# Patient Record
Sex: FEMALE | NOT HISPANIC OR LATINO | Employment: FULL TIME | URBAN - NONMETROPOLITAN AREA
[De-identification: names, ages, dates, MRNs, and addresses within clinical notes are randomized per-mention and may not be internally consistent; named-entity substitution may affect disease eponyms.]

---

## 2023-06-09 ENCOUNTER — APPOINTMENT (OUTPATIENT)
Dept: PRIMARY CARE | Facility: CLINIC | Age: 33
End: 2023-06-09
Payer: COMMERCIAL

## 2023-06-21 ENCOUNTER — APPOINTMENT (OUTPATIENT)
Dept: PRIMARY CARE | Facility: CLINIC | Age: 33
End: 2023-06-21
Payer: COMMERCIAL

## 2023-06-30 ENCOUNTER — APPOINTMENT (OUTPATIENT)
Dept: PRIMARY CARE | Facility: CLINIC | Age: 33
End: 2023-06-30
Payer: COMMERCIAL

## 2023-07-07 LAB
CHLAMYDIA TRACH., AMPLIFIED: NEGATIVE
N. GONORRHEA, AMPLIFIED: NEGATIVE

## 2023-09-05 PROBLEM — F43.10 PTSD (POST-TRAUMATIC STRESS DISORDER): Status: ACTIVE | Noted: 2023-09-05

## 2023-09-05 PROBLEM — Q07.00 ARNOLD-CHIARI MALFORMATION (MULTI): Status: RESOLVED | Noted: 2023-09-05 | Resolved: 2023-09-05

## 2023-09-05 PROBLEM — G93.5 ARNOLD-CHIARI MALFORMATION, TYPE I (MULTI): Status: RESOLVED | Noted: 2023-09-05 | Resolved: 2023-09-05

## 2023-09-05 PROBLEM — F41.8 ANXIETY WITH DEPRESSION: Status: ACTIVE | Noted: 2023-09-05

## 2023-09-05 RX ORDER — ETONOGESTREL AND ETHINYL ESTRADIOL .12; .015 MG/D; MG/D
RING VAGINAL
COMMUNITY
Start: 2022-12-14 | End: 2023-12-01 | Stop reason: ALTCHOICE

## 2023-09-05 RX ORDER — DULOXETIN HYDROCHLORIDE 60 MG/1
60 CAPSULE, DELAYED RELEASE ORAL DAILY
COMMUNITY
End: 2023-12-01 | Stop reason: ALTCHOICE

## 2023-09-05 RX ORDER — RIZATRIPTAN BENZOATE 10 MG/1
10 TABLET ORAL ONCE AS NEEDED
COMMUNITY
Start: 2023-05-17 | End: 2024-05-10 | Stop reason: ALTCHOICE

## 2023-09-05 RX ORDER — DULOXETIN HYDROCHLORIDE 30 MG/1
30 CAPSULE, DELAYED RELEASE ORAL DAILY
COMMUNITY
Start: 2023-03-17 | End: 2023-12-01 | Stop reason: ALTCHOICE

## 2023-09-05 RX ORDER — VALACYCLOVIR HYDROCHLORIDE 500 MG/1
500 TABLET, FILM COATED ORAL DAILY
COMMUNITY
Start: 2023-02-16 | End: 2024-03-14 | Stop reason: SDUPTHER

## 2023-11-28 PROBLEM — S91.119A TOE LACERATION: Status: RESOLVED | Noted: 2023-11-28 | Resolved: 2023-11-28

## 2023-11-28 NOTE — PROGRESS NOTES
Jese Roman is a 33 y.o. female who presents for Establish Care (OB recommending a full panel for thyroid, following with Tabin; stomach issues, GI referral; hx of migraines; declining flu vaccine;)    Needing to establish care:    Chiari malformation: Following with neurologist, Dr. Chloé Munoz. She is seeing her twice a year. She typically gets an MRI every other year. She does have C-spine arthritis and bulging discs. She has seen neurosurgeon but he doesn't want her to get surgery for this until she has seen an eye surgeon. She is having blurry vision that is intermittent, as well as occasional double vision. They are potentially planning for surgery. Discussing removing C1 disc and part of C2 and the skull. Getting dizziness and limb tingling. She has been through PT and working on hydration which help a lot.     Migraines: Doing well on rizatriptan prn. She was given aimovig monthly injections, she has had 1 dose 3 weeks ago, hasn't had a migraine since then. She does have some constant pressure and then vertigo from moving too fast.     She has strong Fhx of thyroid issues. Her twin sister has hyperthyroidism and had to have her thyroid removed.     Stomach issues: She has had stomach issues for many years. She swings back and forth between diarrhea and constipation. She gets a lot of bloating, gas, and abdominal pain.     Review of systems completed and unremarkable other than what is documented in HPI.     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  Medical history:  Medications: rizatriptan,   SurgHx: laparoscopic poly removal  Fhx: strong family history of thyroid issues, she has fraternal twin, grandmother had breast cancer, dad had skin cancer, strong history of depression on both sides, mom had lung cancer that was discovered after she   Allergies: PCN    Objective   /90 (BP Location: Right arm, Patient  "Position: Sitting, BP Cuff Size: Adult)   Pulse 79   Ht 1.6 m (5' 3\")   Wt 61 kg (134 lb 6.4 oz)   BMI 23.81 kg/m²     Gen: No acute distress, alert and oriented x3, pleasant   HEENT: moist mucous membranes, b/l external auditory canals are clear of debris, TMs within normal limits, no oropharyngeal lesions, eomi, perrla   Neck: thyroid within normal limits, no lymphadenopathy   CV: RRR, normal S1/S2, no murmur   Resp: Clear to auscultation bilaterally, no wheezes or rhonchi appreciated  Abd: soft, nontender, non-distended, no guarding/rigidity, bowel sounds present  Extr: no edema, no calf tenderness  Derm: Skin is warm and dry, no rashes appreciated  Psych: mood is good, affect is congruent, good hygiene, normal speech and eye contact  Neuro: cranial nerves grossly intact, normal gait    Assessment/Plan     #Chiari malformation:   Following with neurologist, Dr. Chloé Munoz  Considering surgery  Meeting with eye surgeon prior    #Migraines:   Just starting on aimovig  Doing ok with prn rizatriptan    #Abnormal thyroid function tests:  Check labs    #Abdominal pain  Check labs  Discussed likelihood of IBS  Discussed fiber supplementation  Discussed amitriptyline and dicyclomine  May be interested in GI referral    HCM:  Following with Mary Hay for annual gyn exam  "

## 2023-12-01 ENCOUNTER — LAB (OUTPATIENT)
Dept: LAB | Facility: LAB | Age: 33
End: 2023-12-01
Payer: COMMERCIAL

## 2023-12-01 ENCOUNTER — OFFICE VISIT (OUTPATIENT)
Dept: PRIMARY CARE | Facility: CLINIC | Age: 33
End: 2023-12-01
Payer: COMMERCIAL

## 2023-12-01 VITALS
WEIGHT: 134.4 LBS | BODY MASS INDEX: 23.81 KG/M2 | SYSTOLIC BLOOD PRESSURE: 130 MMHG | HEIGHT: 63 IN | DIASTOLIC BLOOD PRESSURE: 90 MMHG | HEART RATE: 79 BPM

## 2023-12-01 DIAGNOSIS — R94.6 ABNORMAL THYROID FUNCTION TEST: Primary | ICD-10-CM

## 2023-12-01 DIAGNOSIS — R10.9 ABDOMINAL PAIN, UNSPECIFIED ABDOMINAL LOCATION: ICD-10-CM

## 2023-12-01 DIAGNOSIS — R94.6 ABNORMAL THYROID FUNCTION TEST: ICD-10-CM

## 2023-12-01 PROBLEM — M50.30 BULGE OF CERVICAL DISC WITHOUT MYELOPATHY: Status: ACTIVE | Noted: 2023-12-01

## 2023-12-01 PROBLEM — M19.90 ARTHRITIS: Status: ACTIVE | Noted: 2023-12-01

## 2023-12-01 LAB
ALBUMIN SERPL BCP-MCNC: 5 G/DL (ref 3.4–5)
ALP SERPL-CCNC: 30 U/L (ref 33–110)
ALT SERPL W P-5'-P-CCNC: 11 U/L (ref 7–45)
ANION GAP SERPL CALC-SCNC: 11 MMOL/L (ref 10–20)
AST SERPL W P-5'-P-CCNC: 13 U/L (ref 9–39)
BASOPHILS # BLD AUTO: 0.07 X10*3/UL (ref 0–0.1)
BASOPHILS NFR BLD AUTO: 1 %
BILIRUB SERPL-MCNC: 0.3 MG/DL (ref 0–1.2)
BUN SERPL-MCNC: 9 MG/DL (ref 6–23)
CALCIUM SERPL-MCNC: 9.2 MG/DL (ref 8.6–10.3)
CHLORIDE SERPL-SCNC: 104 MMOL/L (ref 98–107)
CO2 SERPL-SCNC: 31 MMOL/L (ref 21–32)
CREAT SERPL-MCNC: 0.71 MG/DL (ref 0.5–1.05)
EOSINOPHIL # BLD AUTO: 0.07 X10*3/UL (ref 0–0.7)
EOSINOPHIL NFR BLD AUTO: 1 %
ERYTHROCYTE [DISTWIDTH] IN BLOOD BY AUTOMATED COUNT: 12.2 % (ref 11.5–14.5)
GFR SERPL CREATININE-BSD FRML MDRD: >90 ML/MIN/1.73M*2
GLUCOSE SERPL-MCNC: 89 MG/DL (ref 74–99)
HCT VFR BLD AUTO: 36.5 % (ref 36–46)
HGB BLD-MCNC: 12.1 G/DL (ref 12–16)
IMM GRANULOCYTES # BLD AUTO: 0.02 X10*3/UL (ref 0–0.7)
IMM GRANULOCYTES NFR BLD AUTO: 0.3 % (ref 0–0.9)
LYMPHOCYTES # BLD AUTO: 1.9 X10*3/UL (ref 1.2–4.8)
LYMPHOCYTES NFR BLD AUTO: 27.7 %
MCH RBC QN AUTO: 30 PG (ref 26–34)
MCHC RBC AUTO-ENTMCNC: 33.2 G/DL (ref 32–36)
MCV RBC AUTO: 91 FL (ref 80–100)
MONOCYTES # BLD AUTO: 0.55 X10*3/UL (ref 0.1–1)
MONOCYTES NFR BLD AUTO: 8 %
NEUTROPHILS # BLD AUTO: 4.25 X10*3/UL (ref 1.2–7.7)
NEUTROPHILS NFR BLD AUTO: 62 %
NRBC BLD-RTO: 0 /100 WBCS (ref 0–0)
PLATELET # BLD AUTO: 285 X10*3/UL (ref 150–450)
POTASSIUM SERPL-SCNC: 4.2 MMOL/L (ref 3.5–5.3)
PROT SERPL-MCNC: 6.9 G/DL (ref 6.4–8.2)
RBC # BLD AUTO: 4.03 X10*6/UL (ref 4–5.2)
SODIUM SERPL-SCNC: 142 MMOL/L (ref 136–145)
T4 FREE SERPL-MCNC: 0.92 NG/DL (ref 0.61–1.12)
TSH SERPL-ACNC: 0.56 MIU/L (ref 0.44–3.98)
WBC # BLD AUTO: 6.9 X10*3/UL (ref 4.4–11.3)

## 2023-12-01 PROCEDURE — 80053 COMPREHEN METABOLIC PANEL: CPT

## 2023-12-01 PROCEDURE — 86003 ALLG SPEC IGE CRUDE XTRC EA: CPT

## 2023-12-01 PROCEDURE — 1036F TOBACCO NON-USER: CPT | Performed by: FAMILY MEDICINE

## 2023-12-01 PROCEDURE — 86800 THYROGLOBULIN ANTIBODY: CPT

## 2023-12-01 PROCEDURE — 84445 ASSAY OF TSI GLOBULIN: CPT

## 2023-12-01 PROCEDURE — 85025 COMPLETE CBC W/AUTO DIFF WBC: CPT

## 2023-12-01 PROCEDURE — 99204 OFFICE O/P NEW MOD 45 MIN: CPT | Performed by: FAMILY MEDICINE

## 2023-12-01 PROCEDURE — 86364 TISS TRNSGLTMNASE EA IG CLAS: CPT

## 2023-12-01 PROCEDURE — 84439 ASSAY OF FREE THYROXINE: CPT

## 2023-12-01 PROCEDURE — 84432 ASSAY OF THYROGLOBULIN: CPT

## 2023-12-01 PROCEDURE — 84481 FREE ASSAY (FT-3): CPT

## 2023-12-01 PROCEDURE — 84443 ASSAY THYROID STIM HORMONE: CPT

## 2023-12-01 PROCEDURE — 36415 COLL VENOUS BLD VENIPUNCTURE: CPT

## 2023-12-01 PROCEDURE — 86258 DGP ANTIBODY EACH IG CLASS: CPT

## 2023-12-01 PROCEDURE — 86376 MICROSOMAL ANTIBODY EACH: CPT

## 2023-12-01 NOTE — PATIENT INSTRUCTIONS
Gastroenterology:  José Miguel Anderson ([facility]) [phone]  José Miguel Garcia ([facility]) [phone]  Jaspreet Mora ([facility]) [phone]  Bushra Zapata () [phone]

## 2023-12-02 LAB
GLIADIN PEPTIDE IGA SER IA-ACNC: <1 U/ML
GLIADIN PEPTIDE IGG SER IA-ACNC: <1 U/ML
T3FREE SERPL-MCNC: 3.8 PG/ML (ref 2.3–4.2)
THYROPEROXIDASE AB SERPL-ACNC: <28 IU/ML
TTG IGA SER IA-ACNC: <1 U/ML
TTG IGG SER IA-ACNC: 1.7 U/ML

## 2023-12-03 LAB
BILL ONLY-THYROGLOBULIN: NORMAL
CLAM IGE QN: <0.1 KU/L
CODFISH IGE QN: <0.1 KU/L
CORN IGE QN: <0.1
EGG WHITE IGE QN: <0.1 KU/L
MILK IGE QN: <0.1 KU/L
PEANUT IGE QN: <0.1 KU/L
SCALLOP IGE QN: <0.1 KU/L
SESAME SEED IGE QN: <0.1 KU/L
SHRIMP IGE QN: <0.1 KU/L
SOYBEAN IGE QN: <0.1 KU/L
THYROGLOB AB SERPL-ACNC: <0.9 IU/ML (ref 0–4)
THYROGLOB SERPL-MCNC: 7.6 NG/ML (ref 1.3–31.8)
THYROGLOB SERPL-MCNC: NORMAL NG/ML (ref 1.3–31.8)
WALNUT IGE QN: <0.1 KU/L
WHEAT IGE QN: <0.1 KU/L

## 2023-12-07 LAB — TSI SER-ACNC: <1 TSI INDEX

## 2023-12-21 PROCEDURE — RXMED WILLOW AMBULATORY MEDICATION CHARGE

## 2023-12-28 ENCOUNTER — PHARMACY VISIT (OUTPATIENT)
Dept: PHARMACY | Facility: CLINIC | Age: 33
End: 2023-12-28
Payer: MEDICAID

## 2024-01-17 ENCOUNTER — OFFICE VISIT (OUTPATIENT)
Dept: OBSTETRICS AND GYNECOLOGY | Facility: CLINIC | Age: 34
End: 2024-01-17
Payer: COMMERCIAL

## 2024-01-17 VITALS
BODY MASS INDEX: 23.57 KG/M2 | SYSTOLIC BLOOD PRESSURE: 124 MMHG | HEIGHT: 63 IN | DIASTOLIC BLOOD PRESSURE: 78 MMHG | WEIGHT: 133 LBS

## 2024-01-17 DIAGNOSIS — Z01.419 WELL WOMAN EXAM: Primary | ICD-10-CM

## 2024-01-17 DIAGNOSIS — Z11.3 SCREENING FOR STD (SEXUALLY TRANSMITTED DISEASE): ICD-10-CM

## 2024-01-17 DIAGNOSIS — Z30.431 SURVEILLANCE OF PREVIOUSLY PRESCRIBED INTRAUTERINE CONTRACEPTIVE DEVICE: ICD-10-CM

## 2024-01-17 PROCEDURE — 99395 PREV VISIT EST AGE 18-39: CPT | Performed by: MIDWIFE

## 2024-01-17 PROCEDURE — 87800 DETECT AGNT MULT DNA DIREC: CPT

## 2024-01-17 PROCEDURE — 1036F TOBACCO NON-USER: CPT | Performed by: MIDWIFE

## 2024-01-17 NOTE — PROGRESS NOTES
Subjective   Patient ID: Jese Roman is a 33 y.o. female  who presents for well woman visit. She is happy with Liletta.  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      Objective   Physical Exam  Vitals and nursing note reviewed.   Constitutional:       Appearance: Normal appearance.   HENT:      Nose: Nose normal.   Eyes:      Pupils: Pupils are equal, round, and reactive to light.   Neck:      Thyroid: No thyroid mass.   Cardiovascular:      Rate and Rhythm: Normal rate and regular rhythm.   Pulmonary:      Effort: Pulmonary effort is normal.      Breath sounds: Normal breath sounds.   Chest:      Chest wall: No mass.   Breasts:     Right: Normal.      Left: Normal.   Abdominal:      Palpations: Abdomen is soft. There is no mass.      Tenderness: There is no abdominal tenderness.   Genitourinary:     General: Normal vulva.      Labia:         Right: No rash, tenderness or lesion.         Left: No rash, tenderness or lesion.       Vagina: Normal.      Cervix: Normal.      Uterus: Normal.       Adnexa: Right adnexa normal and left adnexa normal.      Comments: IUD strings present  Musculoskeletal:         General: Normal range of motion.   Lymphadenopathy:      Cervical: No cervical adenopathy.      Lower Body: No right inguinal adenopathy. No left inguinal adenopathy.   Skin:     General: Skin is warm and dry.   Neurological:      Mental Status: She is alert and oriented to person, place, and time.      Motor: Motor function is intact.      Gait: Gait is intact.   Psychiatric:         Mood and Affect: Mood normal.         Assessment/Plan   Diagnoses and all orders for this visit:  Well woman exam  Screening for STD (sexually transmitted  disease)  -     C. Trachomatis / N. Gonorrhoeae, Amplified Detection       Reassurance given re exam findings  Pt aware that liletta good for 8 yrs  Safer sex advised if Sexually active  RTO AE/PRN    HELEN Mathias-CINDY, ND 01/17/24 2:36 PM

## 2024-01-18 LAB
C TRACH RRNA SPEC QL NAA+PROBE: NEGATIVE
N GONORRHOEA DNA SPEC QL PROBE+SIG AMP: NEGATIVE

## 2024-01-30 DIAGNOSIS — G93.5 CHIARI MALFORMATION TYPE I (MULTI): ICD-10-CM

## 2024-02-27 ENCOUNTER — OFFICE VISIT (OUTPATIENT)
Dept: OPHTHALMOLOGY | Facility: CLINIC | Age: 34
End: 2024-02-27
Payer: COMMERCIAL

## 2024-02-27 DIAGNOSIS — G93.5 CHIARI MALFORMATION TYPE I (MULTI): Primary | ICD-10-CM

## 2024-02-27 PROCEDURE — 99204 OFFICE O/P NEW MOD 45 MIN: CPT | Performed by: PSYCHIATRY & NEUROLOGY

## 2024-02-27 PROCEDURE — 1036F TOBACCO NON-USER: CPT | Performed by: PSYCHIATRY & NEUROLOGY

## 2024-02-27 PROCEDURE — 92133 CPTRZD OPH DX IMG PST SGM ON: CPT | Performed by: PSYCHIATRY & NEUROLOGY

## 2024-02-27 ASSESSMENT — CONF VISUAL FIELD
OS_NORMAL: 1
OS_INFERIOR_TEMPORAL_RESTRICTION: 0
OS_INFERIOR_NASAL_RESTRICTION: 0
OD_NORMAL: 1
OD_INFERIOR_NASAL_RESTRICTION: 0
OD_SUPERIOR_NASAL_RESTRICTION: 0
OD_INFERIOR_TEMPORAL_RESTRICTION: 0
OD_SUPERIOR_TEMPORAL_RESTRICTION: 0
OS_SUPERIOR_TEMPORAL_RESTRICTION: 0
OS_SUPERIOR_NASAL_RESTRICTION: 0

## 2024-02-27 ASSESSMENT — ENCOUNTER SYMPTOMS
MUSCULOSKELETAL NEGATIVE: 0
RESPIRATORY NEGATIVE: 0
EYES NEGATIVE: 1
CARDIOVASCULAR NEGATIVE: 0
ENDOCRINE NEGATIVE: 0
PSYCHIATRIC NEGATIVE: 0
CONSTITUTIONAL NEGATIVE: 0
NEUROLOGICAL NEGATIVE: 0
HEMATOLOGIC/LYMPHATIC NEGATIVE: 0
ALLERGIC/IMMUNOLOGIC NEGATIVE: 0
GASTROINTESTINAL NEGATIVE: 0

## 2024-02-27 ASSESSMENT — TONOMETRY
OD_IOP_MMHG: 15
IOP_METHOD: GOLDMANN APPLANATION
OS_IOP_MMHG: 16

## 2024-02-27 ASSESSMENT — SLIT LAMP EXAM - LIDS
COMMENTS: NORMAL
COMMENTS: NORMAL

## 2024-02-27 ASSESSMENT — VISUAL ACUITY
OD_CC: 20/20-1
METHOD: SNELLEN - LINEAR
OS_CC: 20/20

## 2024-02-27 ASSESSMENT — EXTERNAL EXAM - RIGHT EYE: OD_EXAM: NORMAL

## 2024-02-27 ASSESSMENT — CUP TO DISC RATIO
OD_RATIO: 0.25
OS_RATIO: 0.25

## 2024-02-27 ASSESSMENT — EXTERNAL EXAM - LEFT EYE: OS_EXAM: NORMAL

## 2024-02-27 NOTE — PROGRESS NOTES
Assessment and Plan    08/29/2022 MRI brain w/o contrast, which I personally reviewed, shows Chiari malformation measured as 1.2 cm and stable by Radiology.  Prior head imaging.    12/31/23 fT4 0.92, thyroglobulin 7.6 , TSI <1.0, TPO Ab <28, TSH 0.56  04/19/23 T-spot negative, Varicella IgG positive,   34 y/o F with migraines, chiari malformation, anxiety, PTSD, and arthritis referred for evaluation for optic disc edema.    2/27/2024 OCT RNFL  & . ([facility])    This 33 year-old woman with a history of Chiari malformation, migraines, PTSD, anxiety, arthritis presents for evaluation of possible optic disc edema.    She does not have optic disc edema. This finding does not exclude intracranial pressure elevation, but it does lessen the urgency of intervention from the neuro-ophthalmological perspective. I do not see any eye movement problem such as downbeat nystagmus that also can happen from a cervico-medullary junction problem. She has dry eye as the cause of her other problems along with migraine.    Follow up as needed. (Dilated 2/27/2024)

## 2024-02-28 PROCEDURE — RXMED WILLOW AMBULATORY MEDICATION CHARGE

## 2024-02-29 ENCOUNTER — PHARMACY VISIT (OUTPATIENT)
Dept: PHARMACY | Facility: CLINIC | Age: 34
End: 2024-02-29
Payer: MEDICAID

## 2024-03-14 ENCOUNTER — TELEPHONE (OUTPATIENT)
Dept: OBSTETRICS AND GYNECOLOGY | Facility: CLINIC | Age: 34
End: 2024-03-14
Payer: COMMERCIAL

## 2024-03-14 DIAGNOSIS — B00.9 HSV (HERPES SIMPLEX VIRUS) INFECTION: Primary | ICD-10-CM

## 2024-03-14 RX ORDER — VALACYCLOVIR HYDROCHLORIDE 500 MG/1
500 TABLET, FILM COATED ORAL DAILY
Qty: 90 TABLET | Refills: 3 | Status: SHIPPED | OUTPATIENT
Start: 2024-03-14 | End: 2025-03-14

## 2024-03-27 ENCOUNTER — TELEPHONE (OUTPATIENT)
Dept: PRIMARY CARE | Facility: CLINIC | Age: 34
End: 2024-03-27
Payer: COMMERCIAL

## 2024-03-27 DIAGNOSIS — Z91.09 ENVIRONMENTAL ALLERGIES: Primary | ICD-10-CM

## 2024-03-27 PROCEDURE — RXMED WILLOW AMBULATORY MEDICATION CHARGE

## 2024-03-27 NOTE — TELEPHONE ENCOUNTER
Jese has an appointment in June but is wondering if we can order allergy testing before her follow up?

## 2024-03-28 ENCOUNTER — PHARMACY VISIT (OUTPATIENT)
Dept: PHARMACY | Facility: CLINIC | Age: 34
End: 2024-03-28
Payer: MEDICAID

## 2024-04-01 ENCOUNTER — HOSPITAL ENCOUNTER (OUTPATIENT)
Dept: RADIOLOGY | Facility: HOSPITAL | Age: 34
Discharge: HOME | End: 2024-04-01
Payer: COMMERCIAL

## 2024-04-01 ENCOUNTER — TELEPHONE (OUTPATIENT)
Dept: PRIMARY CARE | Facility: CLINIC | Age: 34
End: 2024-04-01
Payer: COMMERCIAL

## 2024-04-01 DIAGNOSIS — M79.89 LEG SWELLING: Primary | ICD-10-CM

## 2024-04-01 DIAGNOSIS — M79.89 LEG SWELLING: ICD-10-CM

## 2024-04-01 PROCEDURE — 93971 EXTREMITY STUDY: CPT | Performed by: RADIOLOGY

## 2024-04-01 PROCEDURE — 93971 EXTREMITY STUDY: CPT

## 2024-04-01 NOTE — TELEPHONE ENCOUNTER
Jese called with questions about her allergy test that was ordered. She is wondering if this is just a blood collocation test or if this is the one where they do pokes on your back? She would like a full panel allergy test as well.

## 2024-04-02 ENCOUNTER — APPOINTMENT (OUTPATIENT)
Dept: OBSTETRICS AND GYNECOLOGY | Facility: CLINIC | Age: 34
End: 2024-04-02
Payer: COMMERCIAL

## 2024-04-25 PROCEDURE — RXMED WILLOW AMBULATORY MEDICATION CHARGE

## 2024-04-30 ENCOUNTER — PHARMACY VISIT (OUTPATIENT)
Dept: PHARMACY | Facility: CLINIC | Age: 34
End: 2024-04-30
Payer: MEDICAID

## 2024-05-10 ENCOUNTER — PREP FOR PROCEDURE (OUTPATIENT)
Dept: OBSTETRICS AND GYNECOLOGY | Facility: HOSPITAL | Age: 34
End: 2024-05-10

## 2024-05-10 ENCOUNTER — OFFICE VISIT (OUTPATIENT)
Dept: OBSTETRICS AND GYNECOLOGY | Facility: CLINIC | Age: 34
End: 2024-05-10
Payer: COMMERCIAL

## 2024-05-10 VITALS
BODY MASS INDEX: 23.39 KG/M2 | WEIGHT: 132 LBS | DIASTOLIC BLOOD PRESSURE: 76 MMHG | HEIGHT: 63 IN | SYSTOLIC BLOOD PRESSURE: 120 MMHG

## 2024-05-10 DIAGNOSIS — N94.10 DYSPAREUNIA, FEMALE: ICD-10-CM

## 2024-05-10 DIAGNOSIS — N84.2 VAGINAL POLYP: Primary | ICD-10-CM

## 2024-05-10 DIAGNOSIS — N94.10 DYSPAREUNIA, FEMALE: Primary | ICD-10-CM

## 2024-05-10 DIAGNOSIS — N84.2 VAGINAL POLYP: ICD-10-CM

## 2024-05-10 PROCEDURE — 99214 OFFICE O/P EST MOD 30 MIN: CPT | Performed by: OBSTETRICS & GYNECOLOGY

## 2024-05-10 RX ORDER — ACETAMINOPHEN 325 MG/1
975 TABLET ORAL ONCE
Status: CANCELLED | OUTPATIENT
Start: 2024-05-10 | End: 2024-05-10

## 2024-05-10 RX ORDER — GABAPENTIN 600 MG/1
600 TABLET ORAL ONCE
Status: CANCELLED | OUTPATIENT
Start: 2024-05-10 | End: 2024-05-10

## 2024-05-10 RX ORDER — CELECOXIB 50 MG/1
400 CAPSULE ORAL ONCE
Status: CANCELLED | OUTPATIENT
Start: 2024-05-10 | End: 2024-05-10

## 2024-05-10 NOTE — PROGRESS NOTES
Subjective   Patient ID: Jese Roman is a 33 y.o. female who presents for Vaginal issues.  33-year-old  2 para 2.  2 children both when between 70 pounds.  Had physiognomy laceration.  No other gynecologic surgery.  Takes Valtrex daily.    Is a Liletta in place.  Amenorrheic.    Her main concern is that has been uncomfortable to have sex there is a particular area in the vagina where she feels a flap of skin sometimes this is affected by wearing underwear bathing suits tampons and generally uncomfortable.    Exam we reviewed female anatomy including labia majora minora.  There is a slight asymmetry but not significant.  Sometimes the right labia minora gets folded inward causing discomfort so we talked about ways to minimize that.  Inside the vaginal opening on her right side there are 2 vaginal polyps at 630 and 8:00 that are the main areas of discomfort.  We talked about excising these polyps.  Would be a minor outpatient procedure.  Nothing to eat or drink after midnight.  There would be a few stitches that would heal up on their own.  Minimal risk.  Can organize at her convenience    Pression is dyspareunia related to vaginal polyps from having 2 children episiotomy and laceration        Review of Systems   Genitourinary:  Positive for dyspareunia and vaginal pain.       Objective   Physical Exam  Genitourinary:     General: Normal vulva.      Vagina: Lesions present.      Cervix: Normal.          Comments: Vaginal polyps just inside the fourchette at 630 and 8:00.  The 8:00 1 is slightly larger.  Excise both these polyps.        Assessment/Plan   Dyspareunia related to 2 vaginal polyps likely from vaginal births.  Review outpatient excision of these polyps .  Pelvic rest after surgery for 6 weeks.  Allergic to penicillin         Saul Hanson MD 05/10/24 8:47 AM

## 2024-05-11 DIAGNOSIS — B37.31 CANDIDIASIS OF VAGINA: ICD-10-CM

## 2024-05-11 DIAGNOSIS — B37.31 CANDIDIASIS OF VAGINA: Primary | ICD-10-CM

## 2024-05-11 RX ORDER — FLUCONAZOLE 150 MG/1
TABLET ORAL
Qty: 2 TABLET | Refills: 0 | Status: SHIPPED | OUTPATIENT
Start: 2024-05-11 | End: 2024-05-11 | Stop reason: ENTERED-IN-ERROR

## 2024-05-11 RX ORDER — FLUCONAZOLE 150 MG/1
TABLET ORAL
Qty: 2 TABLET | Refills: 0 | Status: SHIPPED | OUTPATIENT
Start: 2024-05-11

## 2024-05-22 ENCOUNTER — ANESTHESIA EVENT (OUTPATIENT)
Dept: OPERATING ROOM | Facility: HOSPITAL | Age: 34
End: 2024-05-22
Payer: COMMERCIAL

## 2024-05-23 ENCOUNTER — ANESTHESIA (OUTPATIENT)
Dept: OPERATING ROOM | Facility: HOSPITAL | Age: 34
End: 2024-05-23
Payer: COMMERCIAL

## 2024-05-23 ENCOUNTER — HOSPITAL ENCOUNTER (OUTPATIENT)
Facility: HOSPITAL | Age: 34
Setting detail: OUTPATIENT SURGERY
Discharge: HOME | End: 2024-05-23
Attending: OBSTETRICS & GYNECOLOGY | Admitting: OBSTETRICS & GYNECOLOGY
Payer: COMMERCIAL

## 2024-05-23 VITALS
HEIGHT: 63 IN | OXYGEN SATURATION: 97 % | WEIGHT: 132.28 LBS | BODY MASS INDEX: 23.44 KG/M2 | DIASTOLIC BLOOD PRESSURE: 78 MMHG | TEMPERATURE: 97.7 F | RESPIRATION RATE: 15 BRPM | HEART RATE: 70 BPM | SYSTOLIC BLOOD PRESSURE: 129 MMHG

## 2024-05-23 DIAGNOSIS — N94.10 DYSPAREUNIA, FEMALE: ICD-10-CM

## 2024-05-23 DIAGNOSIS — N84.2 VAGINAL POLYP: ICD-10-CM

## 2024-05-23 LAB — PREGNANCY TEST URINE, POC: NEGATIVE

## 2024-05-23 PROCEDURE — 7100000002 HC RECOVERY ROOM TIME - EACH INCREMENTAL 1 MINUTE: Performed by: OBSTETRICS & GYNECOLOGY

## 2024-05-23 PROCEDURE — 7100000010 HC PHASE TWO TIME - EACH INCREMENTAL 1 MINUTE: Performed by: OBSTETRICS & GYNECOLOGY

## 2024-05-23 PROCEDURE — 2500000004 HC RX 250 GENERAL PHARMACY W/ HCPCS (ALT 636 FOR OP/ED): Performed by: ANESTHESIOLOGY

## 2024-05-23 PROCEDURE — 3600000003 HC OR TIME - INITIAL BASE CHARGE - PROCEDURE LEVEL THREE: Performed by: OBSTETRICS & GYNECOLOGY

## 2024-05-23 PROCEDURE — 57135 EXCISION VAGINAL CYST/TUMOR: CPT | Performed by: OBSTETRICS & GYNECOLOGY

## 2024-05-23 PROCEDURE — A57135 PR EXCIS VAGINAL CYST/TUMOR: Performed by: NURSE ANESTHETIST, CERTIFIED REGISTERED

## 2024-05-23 PROCEDURE — 3700000002 HC GENERAL ANESTHESIA TIME - EACH INCREMENTAL 1 MINUTE: Performed by: OBSTETRICS & GYNECOLOGY

## 2024-05-23 PROCEDURE — 2500000005 HC RX 250 GENERAL PHARMACY W/O HCPCS: Performed by: NURSE ANESTHETIST, CERTIFIED REGISTERED

## 2024-05-23 PROCEDURE — 3700000001 HC GENERAL ANESTHESIA TIME - INITIAL BASE CHARGE: Performed by: OBSTETRICS & GYNECOLOGY

## 2024-05-23 PROCEDURE — 2500000001 HC RX 250 WO HCPCS SELF ADMINISTERED DRUGS (ALT 637 FOR MEDICARE OP): Performed by: OBSTETRICS & GYNECOLOGY

## 2024-05-23 PROCEDURE — 7100000009 HC PHASE TWO TIME - INITIAL BASE CHARGE: Performed by: OBSTETRICS & GYNECOLOGY

## 2024-05-23 PROCEDURE — 2500000005 HC RX 250 GENERAL PHARMACY W/O HCPCS: Performed by: OBSTETRICS & GYNECOLOGY

## 2024-05-23 PROCEDURE — 7100000001 HC RECOVERY ROOM TIME - INITIAL BASE CHARGE: Performed by: OBSTETRICS & GYNECOLOGY

## 2024-05-23 PROCEDURE — A57135 PR EXCIS VAGINAL CYST/TUMOR: Performed by: ANESTHESIOLOGY

## 2024-05-23 PROCEDURE — 2720000007 HC OR 272 NO HCPCS: Performed by: OBSTETRICS & GYNECOLOGY

## 2024-05-23 PROCEDURE — 2500000004 HC RX 250 GENERAL PHARMACY W/ HCPCS (ALT 636 FOR OP/ED): Performed by: NURSE ANESTHETIST, CERTIFIED REGISTERED

## 2024-05-23 PROCEDURE — 81025 URINE PREGNANCY TEST: CPT | Performed by: ANESTHESIOLOGY

## 2024-05-23 PROCEDURE — 88305 TISSUE EXAM BY PATHOLOGIST: CPT | Mod: TC,GEALAB | Performed by: OBSTETRICS & GYNECOLOGY

## 2024-05-23 PROCEDURE — 3600000008 HC OR TIME - EACH INCREMENTAL 1 MINUTE - PROCEDURE LEVEL THREE: Performed by: OBSTETRICS & GYNECOLOGY

## 2024-05-23 RX ORDER — BUPIVACAINE HYDROCHLORIDE 5 MG/ML
INJECTION, SOLUTION PERINEURAL AS NEEDED
Status: DISCONTINUED | OUTPATIENT
Start: 2024-05-23 | End: 2024-05-23 | Stop reason: HOSPADM

## 2024-05-23 RX ORDER — MIDAZOLAM HYDROCHLORIDE 1 MG/ML
INJECTION, SOLUTION INTRAMUSCULAR; INTRAVENOUS AS NEEDED
Status: DISCONTINUED | OUTPATIENT
Start: 2024-05-23 | End: 2024-05-23

## 2024-05-23 RX ORDER — SODIUM CHLORIDE, SODIUM LACTATE, POTASSIUM CHLORIDE, CALCIUM CHLORIDE 600; 310; 30; 20 MG/100ML; MG/100ML; MG/100ML; MG/100ML
100 INJECTION, SOLUTION INTRAVENOUS CONTINUOUS
Status: DISCONTINUED | OUTPATIENT
Start: 2024-05-23 | End: 2024-05-23 | Stop reason: HOSPADM

## 2024-05-23 RX ORDER — CELECOXIB 400 MG/1
400 CAPSULE ORAL ONCE
Status: COMPLETED | OUTPATIENT
Start: 2024-05-23 | End: 2024-05-23

## 2024-05-23 RX ORDER — GABAPENTIN 300 MG/1
600 CAPSULE ORAL ONCE
Status: COMPLETED | OUTPATIENT
Start: 2024-05-23 | End: 2024-05-23

## 2024-05-23 RX ORDER — ONDANSETRON HYDROCHLORIDE 2 MG/ML
4 INJECTION, SOLUTION INTRAVENOUS ONCE AS NEEDED
Status: DISCONTINUED | OUTPATIENT
Start: 2024-05-23 | End: 2024-05-23 | Stop reason: HOSPADM

## 2024-05-23 RX ORDER — LIDOCAINE HYDROCHLORIDE 20 MG/ML
INJECTION, SOLUTION INFILTRATION; PERINEURAL AS NEEDED
Status: DISCONTINUED | OUTPATIENT
Start: 2024-05-23 | End: 2024-05-23

## 2024-05-23 RX ORDER — OXYCODONE HYDROCHLORIDE 5 MG/1
5 TABLET ORAL EVERY 4 HOURS PRN
Status: DISCONTINUED | OUTPATIENT
Start: 2024-05-23 | End: 2024-05-23 | Stop reason: HOSPADM

## 2024-05-23 RX ORDER — DROPERIDOL 2.5 MG/ML
0.62 INJECTION, SOLUTION INTRAMUSCULAR; INTRAVENOUS ONCE AS NEEDED
Status: DISCONTINUED | OUTPATIENT
Start: 2024-05-23 | End: 2024-05-23 | Stop reason: HOSPADM

## 2024-05-23 RX ORDER — ACETAMINOPHEN 325 MG/1
975 TABLET ORAL ONCE
Status: COMPLETED | OUTPATIENT
Start: 2024-05-23 | End: 2024-05-23

## 2024-05-23 RX ORDER — PROPOFOL 10 MG/ML
INJECTION, EMULSION INTRAVENOUS AS NEEDED
Status: DISCONTINUED | OUTPATIENT
Start: 2024-05-23 | End: 2024-05-23

## 2024-05-23 RX ORDER — ONDANSETRON HYDROCHLORIDE 2 MG/ML
INJECTION, SOLUTION INTRAVENOUS AS NEEDED
Status: DISCONTINUED | OUTPATIENT
Start: 2024-05-23 | End: 2024-05-23

## 2024-05-23 RX ADMIN — LIDOCAINE HYDROCHLORIDE 6 MG: 20 INJECTION, SOLUTION INFILTRATION; PERINEURAL at 10:43

## 2024-05-23 RX ADMIN — MIDAZOLAM 2 MG: 1 INJECTION INTRAMUSCULAR; INTRAVENOUS at 10:34

## 2024-05-23 RX ADMIN — SODIUM CHLORIDE, POTASSIUM CHLORIDE, SODIUM LACTATE AND CALCIUM CHLORIDE 100 ML/HR: 600; 310; 30; 20 INJECTION, SOLUTION INTRAVENOUS at 09:25

## 2024-05-23 RX ADMIN — GABAPENTIN 300 MG: 300 CAPSULE ORAL at 09:24

## 2024-05-23 RX ADMIN — ONDANSETRON 4 MG: 2 INJECTION INTRAMUSCULAR; INTRAVENOUS at 10:42

## 2024-05-23 RX ADMIN — CELECOXIB 400 MG: 400 CAPSULE ORAL at 09:24

## 2024-05-23 RX ADMIN — ACETAMINOPHEN 975 MG: 325 TABLET ORAL at 09:23

## 2024-05-23 RX ADMIN — PROPOFOL 200 MG: 10 INJECTION, EMULSION INTRAVENOUS at 10:43

## 2024-05-23 RX ADMIN — SODIUM CHLORIDE, SODIUM LACTATE, POTASSIUM CHLORIDE, AND CALCIUM CHLORIDE: 600; 310; 30; 20 INJECTION, SOLUTION INTRAVENOUS at 10:39

## 2024-05-23 RX ADMIN — DEXAMETHASONE SODIUM PHOSPHATE 4 MG: 4 INJECTION INTRA-ARTICULAR; INTRALESIONAL; INTRAMUSCULAR; INTRAVENOUS; SOFT TISSUE at 10:42

## 2024-05-23 SDOH — HEALTH STABILITY: MENTAL HEALTH: CURRENT SMOKER: 1

## 2024-05-23 ASSESSMENT — PAIN - FUNCTIONAL ASSESSMENT
PAIN_FUNCTIONAL_ASSESSMENT: 0-10

## 2024-05-23 ASSESSMENT — PAIN SCALES - GENERAL
PAINLEVEL_OUTOF10: 0 - NO PAIN

## 2024-05-23 NOTE — ANESTHESIA POSTPROCEDURE EVALUATION
Patient: Jese Roman    Procedure Summary       Date: 05/23/24 Room / Location: [facility] OR [facility] / [facility]    Anesthesia Start: 1038 Anesthesia Stop: 1115    Procedure: EXCISION TISSUE VAGINA (Right) Diagnosis:       Vaginal polyp      Dyspareunia, female      (Vaginal polyp [N84.2])      (Dyspareunia, female [N94.10])    Surgeons: Saul Hanson MD Responsible Provider: Larry Giraldo DO    Anesthesia Type: general ASA Status: 2            Anesthesia Type: general    Vitals Value Taken Time   /73 05/23/24 1135   Temp 36.5 °C (97.7 °F) 05/23/24 1113   Pulse 68 05/23/24 1135   Resp 16 05/23/24 1135   SpO2 96 % 05/23/24 1135       Anesthesia Post Evaluation    Patient location during evaluation: PACU  Patient participation: complete - patient participated  Level of consciousness: awake and alert  Pain management: adequate  Airway patency: patent  Cardiovascular status: acceptable and blood pressure returned to baseline  Respiratory status: acceptable  Hydration status: acceptable  Postoperative Nausea and Vomiting: none        No notable events documented.

## 2024-05-23 NOTE — H&P
History Of Present Illness  Jese Roman is a 33 y.o. female presenting with      Addendum       Expand All Collapse All       Subjective   Patient ID: Jese Roman is a 33 y.o. female who presents for Vaginal issues.  33-year-old  2 para 2.  2 children both when between 70 pounds.  Had physiognomy laceration.  No other gynecologic surgery.  Takes Valtrex daily.     Is a Liletta in place.  Amenorrheic.     Her main concern is that has been uncomfortable to have sex there is a particular area in the vagina where she feels a flap of skin sometimes this is affected by wearing underwear bathing suits tampons and generally uncomfortable.     Exam we reviewed female anatomy including labia majora minora.  There is a slight asymmetry but not significant.  Sometimes the right labia minora gets folded inward causing discomfort so we talked about ways to minimize that.  Inside the vaginal opening on her right side there are 2 vaginal polyps at 630 and 8:00 that are the main areas of discomfort.  We talked about excising these polyps.  Would be a minor outpatient procedure.  Nothing to eat or drink after midnight.  There would be a few stitches that would heal up on their own.  Minimal risk.  Can organize at her convenience     Pression is dyspareunia related to vaginal polyps from having 2 children episiotomy and laceration           Review of Systems   Genitourinary:  Positive for dyspareunia and vaginal pain.               Objective   Physical Exam  Genitourinary:     General: Normal vulva.      Vagina: Lesions present.      Cervix: Normal.           Comments: Vaginal polyps just inside the fourchette at 630 and 8:00.  The 8:00 1 is slightly larger.  Excise both these polyps.                 Assessment/Plan   Dyspareunia related to 2 vaginal polyps likely from vaginal births.  Review outpatient excision of these polyps .  Pelvic rest after surgery for 6 weeks.  Allergic to penicillin        Saul  "MD Margie 05/10/24 8:47 AM             .     Past Medical History  Past Medical History:   Diagnosis Date    Anxiety     Arnold-Chiari malformation, type I (Multi) 09/05/2023    Arthritis     Chiari malformation type I (Multi)     Depression     Dry eyes     Dyspareunia, female     Headache, unspecified     Chronic headaches    Migraine     PTSD (post-traumatic stress disorder)     Toe laceration 11/28/2023    Vaginal polyp        Surgical History  Past Surgical History:   Procedure Laterality Date    OTHER SURGICAL HISTORY  09/19/2022    Throat surgery        Social History  She reports that she has quit smoking. Her smoking use included cigarettes. She has been exposed to tobacco smoke. She has never used smokeless tobacco. She reports current drug use. Drug: Marijuana. She reports that she does not drink alcohol.    Family History  Family History   Problem Relation Name Age of Onset    Arthritis Mother      Other (head aches) Mother      Hyperthyroidism Father      Skin cancer Father      Melanoma Father      Chiari malformation Sister          Allergies  Penicillins    Review of Systems     Physical Exam     Last Recorded Vitals  Blood pressure (!) 135/94, pulse 91, temperature 36.6 °C (97.9 °F), height 1.6 m (5' 3\"), weight 60 kg (132 lb 4.4 oz), SpO2 100%.    Relevant Results             Assessment/Plan   Active Problems:    Vaginal polyp    Dyspareunia, female             I spent 10 minutes in the professional and overall care of this patient.      Saul Hanson MD    "

## 2024-05-23 NOTE — OP NOTE
Date: 2024  OR Location: [facility] OR    Name: Jese Roman, : 1990, Age: 33 y.o., MRN: [id number], Sex: female    Diagnosis  Pre-op Diagnosis     * Vaginal polyp [N84.2]     * Dyspareunia, female [N94.10] Post-op Diagnosis     * Vaginal polyp [N84.2]     * Dyspareunia, female [N94.10]     Procedures  EXCISION TISSUE VAGINA  [address] - [place] EXCISION VAGINAL CYST/TUMOR    Under general anesthetic lithotomy position patient prepped draped usual manner.  Exam under anesthesia revealed 2 polyps at 630 and 8:00 at the introitus.  Both were grasped with Allises and excised.  Interrupted 2-0 Vicryl sutures were used to reapproximate the vaginal mucosa.  Hemostasis excellent.  Minimal blood loss.  Correct counts.  Specimen of 2 vaginal polyps.  Transferred to recovery room in stable condition  Surgeons      * Saul Hanson - Primary    Resident/Fellow/Other Assistant:  Surgeons and Role:  * No surgeons found with a matching role *    Procedure Summary  Anesthesia: Consult  ASA: II  Anesthesia Staff: Anesthesiologist: Larry Giraldo DO  CRNA: HELEN Beckett-CRNA  Estimated Blood Loss: 10mL  Intra-op Medications:   Administrations occurring from 1035 to 1135 on 24:   Medication Name Total Dose   BUPivacaine HCl (Marcaine) 0.5 % (5 mg/mL) injection 1 mL              Anesthesia Record               Intraprocedure I/O Totals          Intake    LR bolus 500.00 mL    Total Intake 500 mL          Specimen:   ID Type Source Tests Collected by Time   1 : VAGINAL POLYPS Tissue VAGINA EXCISION SURGICAL PATHOLOGY EXAM Saul Hanson MD 2024 1059        Staff:   Shantelle: Stacy  Circulator: Gina Escobedo Person: Shante         Procedure Details:  The patient was seen in the preoperative area. The site of surgery was properly noted/marked if necessary per policy. The patient has been actively warmed in preoperative area. Preoperative antibiotics are not indicated. Venous thrombosis prophylaxis have been  ordered including bilateral sequential compression devices    Findings: Vaginal polyps at 630 and 8:00    Complications:  None; patient tolerated the procedure well.     Disposition: PACU - hemodynamically stable.  Condition: stable

## 2024-05-23 NOTE — ANESTHESIA PROCEDURE NOTES
Airway  Date/Time: 5/23/2024 10:44 AM  Urgency: elective    Airway not difficult    Staffing  Performed: CRNA   Authorized by: HELEN Beckett-CRNA    Performed by: HELEN Beckett-CRNA  Patient location during procedure: OR    Indications and Patient Condition  Indications for airway management: anesthesia  Spontaneous Ventilation: absent  Preoxygenated: yes  Mask difficulty assessment: 0 - not attempted    Final Airway Details  Final airway type: supraglottic airway      Successful airway: Supraglottic airway: IGEL.  Size 4     Number of attempts at approach: 1

## 2024-05-23 NOTE — ANESTHESIA PREPROCEDURE EVALUATION
Patient: Jese Roman    Procedure Information       Date/Time: 05/23/24 1035    Procedure: EXCISION TISSUE VAGINA (Right)    Location: [facility] OR [facility] / [facility]    Surgeons: Saul Hanson MD            Relevant Problems   Anesthesia (within normal limits)      Cardiac (within normal limits)      Pulmonary (within normal limits)      Neuro  Chiari Malformation - Type 1   (+) Anxiety with depression   (+) PTSD (post-traumatic stress disorder)      GI (within normal limits)      /Renal (within normal limits)      Liver (within normal limits)      Endocrine (within normal limits)      Hematology (within normal limits)     Vitals:    05/23/24 0900   BP: (!) 135/94   Pulse: 91   Temp: 36.6 °C (97.9 °F)   SpO2: 100%         Past Surgical History:   Procedure Laterality Date    OTHER SURGICAL HISTORY  09/19/2022    Throat surgery     Past Medical History:   Diagnosis Date    Anxiety     Arnold-Chiari malformation, type I (Multi) 09/05/2023    Arthritis     Chiari malformation type I (Multi)     Depression     Dry eyes     Dyspareunia, female     Headache, unspecified     Chronic headaches    Migraine     PTSD (post-traumatic stress disorder)     Toe laceration 11/28/2023    Vaginal polyp        Current Facility-Administered Medications:     lactated Ringer's infusion, 100 mL/hr, intravenous, Continuous, Mohamdu Sosa MD, Last Rate: 100 mL/hr at 05/23/24 0925, 100 mL/hr at 05/23/24 0925  Prior to Admission medications    Medication Sig Start Date End Date Taking? Authorizing Provider   fremanezumab (Ajovy Syringe) 225 mg/1.5 mL prefilled syringe Inject 1.5 mL (1 pen) under the skin once a month 12/5/23  Yes    valACYclovir (Valtrex) 500 mg tablet Take 1 tablet (500 mg) by mouth once daily. 3/14/24 3/14/25 Yes Samantha Hay APRN-CNM, ND   fluconazole (Diflucan) 150 mg tablet Take 1 tablet by mouth and repeat in 3 days 5/11/24   Samantha Hay APRN-CNM, ND     Allergies   Allergen Reactions    Penicillins  Anaphylaxis     Social History     Tobacco Use    Smoking status: Former     Types: Cigarettes     Passive exposure: Past    Smokeless tobacco: Never   Substance Use Topics    Alcohol use: Never         Chemistry    Lab Results   Component Value Date/Time     12/01/2023 1425    K 4.2 12/01/2023 1425     12/01/2023 1425    CO2 31 12/01/2023 1425    BUN 9 12/01/2023 1425    CREATININE 0.71 12/01/2023 1425    Lab Results   Component Value Date/Time    CALCIUM 9.2 12/01/2023 1425    ALKPHOS 30 (L) 12/01/2023 1425    AST 13 12/01/2023 1425    ALT 11 12/01/2023 1425    BILITOT 0.3 12/01/2023 1425          Lab Results   Component Value Date/Time    WBC 6.9 12/01/2023 1425    HGB 12.1 12/01/2023 1425    HCT 36.5 12/01/2023 1425     12/01/2023 1425     Clinical information reviewed:   Tobacco  Allergies  Meds   Med Hx  Surg Hx   Fam Hx  Soc Hx        NPO Detail:  NPO/Void Status  Date of Last Liquid: 05/23/24  Time of Last Liquid: 0700  Date of Last Solid: 05/22/24  Time of Last Solid: 2300    HCG negative 5/23/2024     Physical Exam    Airway  Mallampati: III  TM distance: >3 FB  Neck ROM: full     Cardiovascular   Rhythm: regular     Dental   Comments: Denies   Pulmonary   Breath sounds clear to auscultation     Abdominal            Anesthesia Plan    History of general anesthesia?: yes  History of complications of general anesthesia?: no    ASA 2     general     The patient is a current smoker. (Vape)  Patient was not previously instructed to abstain from smoking on day of procedure.  Patient smoked on day of procedure.    intravenous induction   Postoperative administration of opioids is intended.  Trial extubation is planned.  Anesthetic plan and risks discussed with patient.  Use of blood products discussed with patient who consented to blood products.    Plan discussed with CRNA.

## 2024-05-24 PROCEDURE — RXMED WILLOW AMBULATORY MEDICATION CHARGE

## 2024-05-28 ENCOUNTER — PHARMACY VISIT (OUTPATIENT)
Dept: PHARMACY | Facility: CLINIC | Age: 34
End: 2024-05-28
Payer: MEDICAID

## 2024-05-28 LAB
LABORATORY COMMENT REPORT: NORMAL
PATH REPORT.COMMENTS IMP SPEC: NORMAL
PATH REPORT.FINAL DX SPEC: NORMAL
PATH REPORT.GROSS SPEC: NORMAL
PATH REPORT.TOTAL CANCER: NORMAL

## 2024-06-07 ENCOUNTER — APPOINTMENT (OUTPATIENT)
Dept: PRIMARY CARE | Facility: CLINIC | Age: 34
End: 2024-06-07
Payer: COMMERCIAL

## 2024-06-12 ENCOUNTER — TELEPHONE (OUTPATIENT)
Dept: OBSTETRICS AND GYNECOLOGY | Facility: CLINIC | Age: 34
End: 2024-06-12
Payer: COMMERCIAL

## 2024-06-12 DIAGNOSIS — B00.9 HSV (HERPES SIMPLEX VIRUS) INFECTION: ICD-10-CM

## 2024-06-12 RX ORDER — VALACYCLOVIR HYDROCHLORIDE 500 MG/1
500 TABLET, FILM COATED ORAL DAILY
Qty: 90 TABLET | Refills: 3 | Status: SHIPPED | OUTPATIENT
Start: 2024-06-12 | End: 2025-06-12

## 2024-06-13 PROCEDURE — RXMED WILLOW AMBULATORY MEDICATION CHARGE

## 2024-06-17 ENCOUNTER — PHARMACY VISIT (OUTPATIENT)
Dept: PHARMACY | Facility: CLINIC | Age: 34
End: 2024-06-17
Payer: MEDICAID

## 2024-06-24 ENCOUNTER — APPOINTMENT (OUTPATIENT)
Dept: OPHTHALMOLOGY | Facility: CLINIC | Age: 34
End: 2024-06-24
Payer: COMMERCIAL

## 2024-06-27 ENCOUNTER — APPOINTMENT (OUTPATIENT)
Dept: OPHTHALMOLOGY | Facility: CLINIC | Age: 34
End: 2024-06-27
Payer: COMMERCIAL

## 2024-07-02 ENCOUNTER — APPOINTMENT (OUTPATIENT)
Dept: OBSTETRICS AND GYNECOLOGY | Facility: CLINIC | Age: 34
End: 2024-07-02
Payer: COMMERCIAL

## 2024-07-02 VITALS
WEIGHT: 138 LBS | BODY MASS INDEX: 24.45 KG/M2 | DIASTOLIC BLOOD PRESSURE: 68 MMHG | HEIGHT: 63 IN | SYSTOLIC BLOOD PRESSURE: 108 MMHG

## 2024-07-02 DIAGNOSIS — Z48.89 POSTOPERATIVE VISIT: Primary | ICD-10-CM

## 2024-07-02 PROCEDURE — 99024 POSTOP FOLLOW-UP VISIT: CPT | Performed by: OBSTETRICS & GYNECOLOGY

## 2024-07-09 PROCEDURE — RXMED WILLOW AMBULATORY MEDICATION CHARGE

## 2024-07-11 ENCOUNTER — PHARMACY VISIT (OUTPATIENT)
Dept: PHARMACY | Facility: CLINIC | Age: 34
End: 2024-07-11
Payer: MEDICAID

## 2024-08-01 PROCEDURE — RXMED WILLOW AMBULATORY MEDICATION CHARGE

## 2024-08-07 ENCOUNTER — PHARMACY VISIT (OUTPATIENT)
Dept: PHARMACY | Facility: CLINIC | Age: 34
End: 2024-08-07
Payer: MEDICAID

## 2024-09-09 ENCOUNTER — LAB (OUTPATIENT)
Dept: LAB | Facility: LAB | Age: 34
End: 2024-09-09
Payer: COMMERCIAL

## 2024-09-09 DIAGNOSIS — Z91.09 ENVIRONMENTAL ALLERGIES: ICD-10-CM

## 2024-09-09 PROCEDURE — 82785 ASSAY OF IGE: CPT

## 2024-09-09 PROCEDURE — 86003 ALLG SPEC IGE CRUDE XTRC EA: CPT

## 2024-09-09 PROCEDURE — 36415 COLL VENOUS BLD VENIPUNCTURE: CPT

## 2024-09-11 LAB

## 2024-09-26 ENCOUNTER — EVALUATION (OUTPATIENT)
Dept: PHYSICAL THERAPY | Facility: CLINIC | Age: 34
End: 2024-09-26
Payer: COMMERCIAL

## 2024-09-26 DIAGNOSIS — M54.16 LUMBAR RADICULOPATHY, CHRONIC: Primary | ICD-10-CM

## 2024-09-26 PROCEDURE — 97110 THERAPEUTIC EXERCISES: CPT | Mod: GP | Performed by: PHYSICAL THERAPIST

## 2024-09-26 PROCEDURE — 97162 PT EVAL MOD COMPLEX 30 MIN: CPT | Mod: GP | Performed by: PHYSICAL THERAPIST

## 2024-09-26 ASSESSMENT — PATIENT HEALTH QUESTIONNAIRE - PHQ9
10. IF YOU CHECKED OFF ANY PROBLEMS, HOW DIFFICULT HAVE THESE PROBLEMS MADE IT FOR YOU TO DO YOUR WORK, TAKE CARE OF THINGS AT HOME, OR GET ALONG WITH OTHER PEOPLE: NOT DIFFICULT AT ALL
2. FEELING DOWN, DEPRESSED OR HOPELESS: SEVERAL DAYS
1. LITTLE INTEREST OR PLEASURE IN DOING THINGS: SEVERAL DAYS
SUM OF ALL RESPONSES TO PHQ9 QUESTIONS 1 AND 2: 2

## 2024-09-26 ASSESSMENT — ENCOUNTER SYMPTOMS
LOSS OF SENSATION IN FEET: 0
OCCASIONAL FEELINGS OF UNSTEADINESS: 0
DEPRESSION: 0

## 2024-09-26 ASSESSMENT — PAIN SCALES - GENERAL: PAINLEVEL_OUTOF10: 7

## 2024-09-26 ASSESSMENT — PAIN - FUNCTIONAL ASSESSMENT: PAIN_FUNCTIONAL_ASSESSMENT: 0-10

## 2024-10-10 ENCOUNTER — APPOINTMENT (OUTPATIENT)
Dept: PHYSICAL THERAPY | Facility: CLINIC | Age: 34
End: 2024-10-10
Payer: COMMERCIAL

## 2024-10-14 PROCEDURE — RXMED WILLOW AMBULATORY MEDICATION CHARGE

## 2024-10-15 NOTE — PROGRESS NOTES
Subjective   Patient ID: Jese Roman is a 33 y.o. female who presents for Follow-up (No concerns at this time; ).    Chiari malformation: Following with neurologist, Dr. Chloé Munoz. She is seeing her twice a year. She typically gets an MRI every other year. She does have C-spine arthritis and bulging discs. She has seen neurosurgeon but he doesn't want her to get surgery for this until she has seen an eye surgeon. She is having blurry vision that is intermittent, as well as occasional double vision. She finally got an eye check and confirmed vision symptoms are from Chiari. They are potentially planning for surgery. Discussing removing C1 disc and part of C2 and the skull. Getting dizziness and limb tingling. She has been through PT and working on hydration which help a lot. She hurt her back over the summer and went to the chiropractor in [place] (Dr. Hira Plunkett). She has been to 1 PT appt.     Low back pain: Left sided lumbosacral area. She fell from a stnading position on a chair and landed on her left sided buttock/low back. Couldn't bear weight or roll over due to her pain. She spoke with chiropractor and neurosurgeon. Not interested in muscle relaxer at this point. She has tried a lot of mental health meds in the past that she had very scary reactions to and so she is careful about trying new meds.      Migraines: Doing well on aimovig with prn rizatriptan. She does have some constant pressure and then vertigo from moving too fast. She is taking higher amounts of ibuprofen. Working on water intake recently.      She has strong Fhx of thyroid issues. Her twin sister has hyperthyroidism and had to have her thyroid removed.      Stomach issues: She has had stomach issues for many years. She swings back and forth between diarrhea and constipation. She gets a lot of bloating, gas, and abdominal pain. She has been keeping a food journal, noticing that carbs and dairy are big triggers for her. She is  planning to check in with GI at this point. She has been taking metamucil which does help. Not interested in trying meds.      Review of systems completed and unremarkable other than what is documented in HPI.    Objective   /81 (BP Location: Right arm, Patient Position: Sitting, BP Cuff Size: Adult)   Pulse 86   Wt 63.1 kg (139 lb 3.2 oz)   BMI 24.66 kg/m²     Gen: No acute distress, alert and oriented x3, pleasant   HEENT: moist mucous membranes, b/l external auditory canals are clear of debris, TMs within normal limits, no oropharyngeal lesions, eomi, perrla   Neck: thyroid within normal limits, no lymphadenopathy   CV: RRR, normal S1/S2, no murmur   Resp: Clear to auscultation bilaterally, no wheezes or rhonchi appreciated  Abd: soft, nontender, non-distended, no guarding/rigidity, bowel sounds present  Extr: no edema, no calf tenderness  Derm: Skin is warm and dry, no rashes appreciated  Psych: mood is good, affect is congruent, good hygiene, normal speech and eye contact  Neuro: cranial nerves grossly intact, normal gait    Assessment/Plan   #Chiari malformation:   Following with neurologist, Dr. Chloé Munoz  She has 1 year follow up  Considering surgery  She was cleared by the eye doctor     #Migraines:   Just starting on aimovig  Doing ok with prn rizatriptan, she may have had SE's on this before     #Abnormal thyroid function tests:  Check labs     #Abdominal pain  Check labs  Discussed likelihood of IBS  Discussed fiber supplementation  Discussed amitriptyline and dicyclomine  May be interested in GI referral     HCM:  Following with Mary Hay for annual gyn exam

## 2024-10-16 ENCOUNTER — PHARMACY VISIT (OUTPATIENT)
Dept: PHARMACY | Facility: CLINIC | Age: 34
End: 2024-10-16
Payer: MEDICAID

## 2024-10-17 ENCOUNTER — APPOINTMENT (OUTPATIENT)
Dept: PHYSICAL THERAPY | Facility: CLINIC | Age: 34
End: 2024-10-17
Payer: COMMERCIAL

## 2024-10-18 ENCOUNTER — APPOINTMENT (OUTPATIENT)
Dept: PRIMARY CARE | Facility: CLINIC | Age: 34
End: 2024-10-18
Payer: COMMERCIAL

## 2024-10-18 VITALS
DIASTOLIC BLOOD PRESSURE: 81 MMHG | WEIGHT: 139.2 LBS | BODY MASS INDEX: 24.66 KG/M2 | HEART RATE: 86 BPM | SYSTOLIC BLOOD PRESSURE: 127 MMHG

## 2024-10-18 DIAGNOSIS — F33.2 SEVERE EPISODE OF RECURRENT MAJOR DEPRESSIVE DISORDER, WITHOUT PSYCHOTIC FEATURES (MULTI): Primary | ICD-10-CM

## 2024-10-18 DIAGNOSIS — R94.6 ABNORMAL THYROID FUNCTION TEST: ICD-10-CM

## 2024-10-18 DIAGNOSIS — M54.50 LOW BACK PAIN, UNSPECIFIED BACK PAIN LATERALITY, UNSPECIFIED CHRONICITY, UNSPECIFIED WHETHER SCIATICA PRESENT: ICD-10-CM

## 2024-10-18 PROCEDURE — 99214 OFFICE O/P EST MOD 30 MIN: CPT | Performed by: FAMILY MEDICINE

## 2024-10-18 PROCEDURE — 1036F TOBACCO NON-USER: CPT | Performed by: FAMILY MEDICINE

## 2024-10-24 ENCOUNTER — TREATMENT (OUTPATIENT)
Dept: PHYSICAL THERAPY | Facility: CLINIC | Age: 34
End: 2024-10-24
Payer: COMMERCIAL

## 2024-10-24 DIAGNOSIS — M54.16 LUMBAR RADICULOPATHY, CHRONIC: ICD-10-CM

## 2024-10-24 PROCEDURE — 97110 THERAPEUTIC EXERCISES: CPT | Mod: GP | Performed by: PHYSICAL THERAPIST

## 2024-10-24 PROCEDURE — 97140 MANUAL THERAPY 1/> REGIONS: CPT | Mod: GP | Performed by: PHYSICAL THERAPIST

## 2024-10-24 ASSESSMENT — PAIN DESCRIPTION - DESCRIPTORS: DESCRIPTORS: ACHING;NAGGING

## 2024-10-24 ASSESSMENT — PAIN SCALES - GENERAL: PAINLEVEL_OUTOF10: 5 - MODERATE PAIN

## 2024-10-24 ASSESSMENT — PAIN - FUNCTIONAL ASSESSMENT: PAIN_FUNCTIONAL_ASSESSMENT: 0-10

## 2024-10-24 NOTE — PROGRESS NOTES
"  Physical Therapy Treatment    Patient Name: Jese Roman  MRN: [id number]  Today's Date: 10/24/2024  Time Calculation  Start Time: 1222  Stop Time: 1303  Time Calculation (min): 41 min  PT Therapeutic Procedures Time Entry  Manual Therapy Time Entry: 10  Therapeutic Exercise Time Entry: 30       Current Problem  Problem List Items Addressed This Visit             ICD-10-CM       Neuro    Lumbar radiculopathy, chronic M54.16       Insurance:  Number of Treatments Authorized: 2/20  Certification Period Start Date: 09/26/24  Certification Period End Date: 12/27/24    Subjective   General  Reason for Referral: L lumbar radiculopathy to L glute/posterior thigh  Referred By: Julieta Bernard DO  Past Medical History Relevant to Rehab: Chiari Malformation Type I, migraines/headaches, RA  General Comment: Pt reports she's doing better overall since IE. Difficulty with putting on shoes/socks and getting into the car. Less pain down into toes since initiating exercises. Sitting too long hurts. Going to get an x-ray for her back.    Performing HEP?: Yes    Precautions  Precautions  STEADI Fall Risk Score (The score of 4 or more indicates an increased risk of falling): 0  Precautions Comment: none  Pain  Pain Assessment: 0-10  0-10 (Numeric) Pain Score: 5 - Moderate pain  Pain Type: Chronic pain  Pain Location: Back  Pain Orientation: Left  Pain Radiating Towards: L glute  Pain Descriptors: Aching, Nagging  Pain Frequency: Constant/continuous    Objective    (+) L SIJ tenderness / pain      Treatments:  Therapeutic Exercise  Therapeutic Exercise Activity 1: SciFit LE L2 Manual x 5 minutes  Therapeutic Exercise Activity 2: Seated back flexion 10\" x 5 reps  Therapeutic Exercise Activity 3: Standing back extension hands at wall x 10 reps - small range - painful  Therapeutic Exercise Activity 4: R,L diagonal hip extension x 8 reps each  Therapeutic Exercise Activity 5: R,L hip abduction x 10 reps each  Therapeutic " "Exercise Activity 6: DKTC 30 sec x 3  Therapeutic Exercise Activity 7: L SKTC 15\" x 3  Therapeutic Exercise Activity 8: L clams x 10 reps  Therapeutic Exercise Activity 9: H/L adductor squeeze yellow ball 3\" x 1 min  Therapeutic Exercise Activity 10: Lumbar rocking H/L x 1 min  Therapeutic Exercise Activity 11: Bennett pose x 30\"  Therapeutic Exercise Activity 12: TA activation 3\" x 1 min  Therapeutic Exercise Activity 13: Seated back flexion 10 sec x 4 reps  Therapeutic Exercise Activity 14: Seated with lumbar roll    Manual Therapy  Manual Therapy Performed: Yes  Manual Therapy Activity 1: L QL lengthening, STM  Manual Therapy Activity 2: Light L glute and piriformis release  Manual Therapy Activity 3: Inf sacral glide in bennett pose    OP EDUCATION:  Outpatient Education  Education Comment: Child's pose, yoga 2x/week, lumbar roll    Assessment:  PT Assessment  Assessment Comment: Pt showing progress with pain levels and reduced radicular symptoms since IE. Pt continues to have increased pain with standing back ext and standing hip exercises. Relief with bennett pose, seated back flexion and DKTC exercises. Pain persists with gentle stabilization exercises - held for HEP at this time.    Plan:  OP PT Plan  Treatment/Interventions: Education/ Instruction, Electrical stimulation, Gait training, Hot pack, Manual therapy, Neuromuscular re-education, Self care/ home management, Taping techniques, Therapeutic activities, Therapeutic exercises  PT Plan: Skilled PT (response to flexion, NWB>WB strengthening as able)  PT Frequency: 1 time per week  Duration: 8-10 weeks, reducing frequency as goals are met  Onset Date: 03/01/24  Certification Period Start Date: 09/26/24  Certification Period End Date: 12/27/24  Number of Treatments Authorized: 2/20  Rehab Potential: Good  Plan of Care Agreement: Patient    Goals:  Active       L lumbar radiculopathy       STG/LTG       Start:  09/26/24    Expected End:  12/05/24       STG  1) " Patient will improve Oswestry Low Back Disability Questionnaire by 10% in order to indicate a measurable improvement in daily function and ability to complete daily tasks in 4 weeks.  2) Patient will be able to complete ADLs with pain less than 4/10 in lumbar region in 4 weeks.  3) Patient will have 75% of normal lumbar ROM in order to allow for proper bathing and dressing in 4 weeks.   4) Patient will be independent with HEP to allow for continued improvement in daily tasks at home and in the community in 3 visits.   LTG  1) Patient will have >/=4+/5 strength in lateral hip musculature to aid in stability with ambulation on varied surfaces in community in 8 weeks  2) Patient will be able to perform proper squatting technique and sitting postures in order to prevent increased pain with daily tasks in 8 weeks.  3) Patient will be able to perform >30 seconds of SLS on even ground in order to allow for safe ambulation and to demonstrate reduced fall risk within the community in 8 weeks.   4) Patient will improve Oswestry Low Back Disability Questionnaire to </=15% in order to indicate a measurable improvement in daily function and ability to complete daily tasks in 8 weeks.  5) Pt will get back to strength regimen for wellness to improve QOL by discharge.                   Jaylene Paris, PT

## 2024-10-28 ENCOUNTER — LAB (OUTPATIENT)
Dept: LAB | Facility: LAB | Age: 34
End: 2024-10-28
Payer: COMMERCIAL

## 2024-10-28 ENCOUNTER — HOSPITAL ENCOUNTER (OUTPATIENT)
Dept: RADIOLOGY | Facility: HOSPITAL | Age: 34
Discharge: HOME | End: 2024-10-28
Payer: COMMERCIAL

## 2024-10-28 DIAGNOSIS — R94.6 ABNORMAL THYROID FUNCTION TEST: ICD-10-CM

## 2024-10-28 DIAGNOSIS — M54.50 LOW BACK PAIN, UNSPECIFIED BACK PAIN LATERALITY, UNSPECIFIED CHRONICITY, UNSPECIFIED WHETHER SCIATICA PRESENT: ICD-10-CM

## 2024-10-28 LAB
ALBUMIN SERPL BCP-MCNC: 4.5 G/DL (ref 3.4–5)
ALP SERPL-CCNC: 29 U/L (ref 33–110)
ALT SERPL W P-5'-P-CCNC: 16 U/L (ref 7–45)
ANION GAP SERPL CALC-SCNC: 9 MMOL/L (ref 10–20)
AST SERPL W P-5'-P-CCNC: 15 U/L (ref 9–39)
BASOPHILS # BLD AUTO: 0.04 X10*3/UL (ref 0–0.1)
BASOPHILS NFR BLD AUTO: 0.8 %
BILIRUB SERPL-MCNC: 0.3 MG/DL (ref 0–1.2)
BUN SERPL-MCNC: 6 MG/DL (ref 6–23)
CALCIUM SERPL-MCNC: 9.4 MG/DL (ref 8.6–10.3)
CHLORIDE SERPL-SCNC: 103 MMOL/L (ref 98–107)
CO2 SERPL-SCNC: 31 MMOL/L (ref 21–32)
CREAT SERPL-MCNC: 0.65 MG/DL (ref 0.5–1.05)
EGFRCR SERPLBLD CKD-EPI 2021: >90 ML/MIN/1.73M*2
EOSINOPHIL # BLD AUTO: 0.13 X10*3/UL (ref 0–0.7)
EOSINOPHIL NFR BLD AUTO: 2.5 %
ERYTHROCYTE [DISTWIDTH] IN BLOOD BY AUTOMATED COUNT: 12.3 % (ref 11.5–14.5)
GLUCOSE SERPL-MCNC: 85 MG/DL (ref 74–99)
HCT VFR BLD AUTO: 38.6 % (ref 36–46)
HGB BLD-MCNC: 12.4 G/DL (ref 12–16)
IMM GRANULOCYTES # BLD AUTO: 0.02 X10*3/UL (ref 0–0.7)
IMM GRANULOCYTES NFR BLD AUTO: 0.4 % (ref 0–0.9)
LYMPHOCYTES # BLD AUTO: 1.47 X10*3/UL (ref 1.2–4.8)
LYMPHOCYTES NFR BLD AUTO: 28.1 %
MCH RBC QN AUTO: 29.7 PG (ref 26–34)
MCHC RBC AUTO-ENTMCNC: 32.1 G/DL (ref 32–36)
MCV RBC AUTO: 92 FL (ref 80–100)
MONOCYTES # BLD AUTO: 0.59 X10*3/UL (ref 0.1–1)
MONOCYTES NFR BLD AUTO: 11.3 %
NEUTROPHILS # BLD AUTO: 2.98 X10*3/UL (ref 1.2–7.7)
NEUTROPHILS NFR BLD AUTO: 56.9 %
NRBC BLD-RTO: 0 /100 WBCS (ref 0–0)
PLATELET # BLD AUTO: 263 X10*3/UL (ref 150–450)
POTASSIUM SERPL-SCNC: 4.3 MMOL/L (ref 3.5–5.3)
PROT SERPL-MCNC: 7 G/DL (ref 6.4–8.2)
RBC # BLD AUTO: 4.18 X10*6/UL (ref 4–5.2)
SODIUM SERPL-SCNC: 139 MMOL/L (ref 136–145)
TSH SERPL-ACNC: 1.56 MIU/L (ref 0.44–3.98)
WBC # BLD AUTO: 5.2 X10*3/UL (ref 4.4–11.3)

## 2024-10-28 PROCEDURE — 72110 X-RAY EXAM L-2 SPINE 4/>VWS: CPT | Performed by: RADIOLOGY

## 2024-10-28 PROCEDURE — 85025 COMPLETE CBC W/AUTO DIFF WBC: CPT

## 2024-10-28 PROCEDURE — 80053 COMPREHEN METABOLIC PANEL: CPT

## 2024-10-28 PROCEDURE — 84443 ASSAY THYROID STIM HORMONE: CPT

## 2024-10-28 PROCEDURE — 36415 COLL VENOUS BLD VENIPUNCTURE: CPT

## 2024-10-28 PROCEDURE — 73502 X-RAY EXAM HIP UNI 2-3 VIEWS: CPT | Mod: LT

## 2024-10-28 PROCEDURE — 73502 X-RAY EXAM HIP UNI 2-3 VIEWS: CPT | Mod: LEFT SIDE | Performed by: RADIOLOGY

## 2024-10-28 PROCEDURE — 72110 X-RAY EXAM L-2 SPINE 4/>VWS: CPT

## 2024-10-31 ENCOUNTER — TREATMENT (OUTPATIENT)
Dept: PHYSICAL THERAPY | Facility: CLINIC | Age: 34
End: 2024-10-31
Payer: COMMERCIAL

## 2024-10-31 DIAGNOSIS — M54.16 LUMBAR RADICULOPATHY, CHRONIC: ICD-10-CM

## 2024-10-31 PROCEDURE — 97110 THERAPEUTIC EXERCISES: CPT | Mod: GP | Performed by: PHYSICAL THERAPIST

## 2024-10-31 PROCEDURE — 97530 THERAPEUTIC ACTIVITIES: CPT | Mod: GP | Performed by: PHYSICAL THERAPIST

## 2024-10-31 ASSESSMENT — PAIN SCALES - GENERAL: PAINLEVEL_OUTOF10: 5 - MODERATE PAIN

## 2024-10-31 ASSESSMENT — PAIN DESCRIPTION - DESCRIPTORS: DESCRIPTORS: ACHING;NAGGING

## 2024-10-31 ASSESSMENT — PAIN - FUNCTIONAL ASSESSMENT: PAIN_FUNCTIONAL_ASSESSMENT: 0-10

## 2024-11-07 ENCOUNTER — TREATMENT (OUTPATIENT)
Dept: PHYSICAL THERAPY | Facility: CLINIC | Age: 34
End: 2024-11-07
Payer: COMMERCIAL

## 2024-11-07 DIAGNOSIS — M54.16 LUMBAR RADICULOPATHY, CHRONIC: ICD-10-CM

## 2024-11-07 PROCEDURE — 97110 THERAPEUTIC EXERCISES: CPT | Mod: GP | Performed by: PHYSICAL THERAPIST

## 2024-11-07 PROCEDURE — 97140 MANUAL THERAPY 1/> REGIONS: CPT | Mod: GP | Performed by: PHYSICAL THERAPIST

## 2024-11-07 ASSESSMENT — PAIN SCALES - GENERAL: PAINLEVEL_OUTOF10: 5 - MODERATE PAIN

## 2024-11-07 ASSESSMENT — PAIN DESCRIPTION - DESCRIPTORS: DESCRIPTORS: ACHING;NAGGING

## 2024-11-07 ASSESSMENT — PAIN - FUNCTIONAL ASSESSMENT: PAIN_FUNCTIONAL_ASSESSMENT: 0-10

## 2024-11-07 NOTE — PROGRESS NOTES
"Physical Therapy Treatment    Patient Name: Jese Roman  MRN: [id number]  Today's Date: 11/7/2024    Current Problem  Problem List Items Addressed This Visit             ICD-10-CM    Lumbar radiculopathy, chronic M54.16       Insurance:  Payor: [facility] / Plan: [employer] / Product Type: *No Product type* /   Number of Treatments Authorized: 4/20  Certification Period Start Date: 09/26/24  Certification Period End Date: 12/27/24    Subjective   General  Reason for Referral: L lumbar radiculopathy to L glute/posterior thigh  Referred By: Julieta Bernard DO  Past Medical History Relevant to Rehab: Chiari Malformation Type I, migraines/headaches, RA  General Comment: Patient states that she feels improvement over the past week. Notes that she has pain more in the morning still. Has been consistent with her exercises and using the lumbar roll.    Performing HEP?: Yes    Precautions  Precautions  STEADI Fall Risk Score (The score of 4 or more indicates an increased risk of falling): 0  Precautions Comment: none  Pain  Pain Assessment: 0-10  0-10 (Numeric) Pain Score: 5 - Moderate pain  Pain Type: Chronic pain  Pain Location: Back  Pain Orientation: Left  Pain Radiating Towards: L glute to back of the knee  Pain Descriptors: Aching, Nagging  Pain Frequency: Constant/continuous    Objective   TTP L piriformis    Treatments:    Therapeutic Exercise  Therapeutic Exercise Activity 1: SciFit LE L2 Line [place] x 6 minutes  Therapeutic Exercise Activity 2: Dynamics: high knees, butt kicks, hip openers, hip closers, x 40' each  Therapeutic Exercise Activity 3: Dynamics: high knees, butt kicks, hip openers, side lunge, x 40' each  Therapeutic Exercise Activity 4: Standing clamshell yellow PB 2 x 10 ea  Therapeutic Exercise Activity 5: TGL7 2 x 15  Therapeutic Exercise Activity 6: Pallof press 5\" hold 2 x 10 ea 7.5# matrix         Manual Therapy  Manual Therapy Activity 1: Light L glute and piriformis release      OP " EDUCATION:  Outpatient Education  Education Comment: Access Code: [email or web address]  URL: [email or web address]  Date: 11/07/2024  Prepared by: Cornelius Thomas    Exercises  - Standing Anti-Rotation Press with Anchored Resistance  - 1 x daily - 7 x weekly - 3 sets - 10 reps - 5 sec hold    Assessment:  PT Assessment  Assessment Comment: Patient with continued improvement in centralization of symptoms.  Continue to focus on posterior chain and hip strengthening as left lower extremity remains weaker than the right but globally weak.  Educated patient on bracing with transfers to allow for reduced pinching and pain through gluteal and radicular symptoms.    Plan:  OP PT Plan  Treatment/Interventions: Education/ Instruction, Electrical stimulation, Gait training, Hot pack, Manual therapy, Neuromuscular re-education, Self care/ home management, Taping techniques, Therapeutic activities, Therapeutic exercises  PT Plan: Skilled PT (response to flexion, NWB>WB strengthening as able)  PT Frequency: 1 time per week  Duration: 8-10 weeks, reducing frequency as goals are met  Onset Date: 03/01/24  Certification Period Start Date: 09/26/24  Certification Period End Date: 12/27/24  Number of Treatments Authorized: 4/20  Rehab Potential: Good  Plan of Care Agreement: Patient    Goals:  Active       L lumbar radiculopathy       STG/LTG       Start:  09/26/24    Expected End:  12/05/24       STG  1) Patient will improve Oswestry Low Back Disability Questionnaire by 10% in order to indicate a measurable improvement in daily function and ability to complete daily tasks in 4 weeks.  2) Patient will be able to complete ADLs with pain less than 4/10 in lumbar region in 4 weeks.  3) Patient will have 75% of normal lumbar ROM in order to allow for proper bathing and dressing in 4 weeks.   4) Patient will be independent with HEP to allow for continued improvement in daily tasks at home and in the community in 3 visits.   LTG  1)  Patient will have >/=4+/5 strength in lateral hip musculature to aid in stability with ambulation on varied surfaces in community in 8 weeks  2) Patient will be able to perform proper squatting technique and sitting postures in order to prevent increased pain with daily tasks in 8 weeks.  3) Patient will be able to perform >30 seconds of SLS on even ground in order to allow for safe ambulation and to demonstrate reduced fall risk within the community in 8 weeks.   4) Patient will improve Oswestry Low Back Disability Questionnaire to </=15% in order to indicate a measurable improvement in daily function and ability to complete daily tasks in 8 weeks.  5) Pt will get back to strength regimen for wellness to improve QOL by discharge.                   Time Calculation  Start Time: 1204  Stop Time: 1245  Time Calculation (min): 41 min  PT Therapeutic Procedures Time Entry  Manual Therapy Time Entry: 9  Therapeutic Exercise Time Entry: 30,

## 2024-11-14 ENCOUNTER — APPOINTMENT (OUTPATIENT)
Dept: PHYSICAL THERAPY | Facility: CLINIC | Age: 34
End: 2024-11-14
Payer: COMMERCIAL

## 2024-11-14 DIAGNOSIS — M54.16 LUMBAR RADICULOPATHY, CHRONIC: ICD-10-CM

## 2024-11-21 ENCOUNTER — TREATMENT (OUTPATIENT)
Dept: PHYSICAL THERAPY | Facility: CLINIC | Age: 34
End: 2024-11-21
Payer: COMMERCIAL

## 2024-11-21 DIAGNOSIS — M54.16 LUMBAR RADICULOPATHY, CHRONIC: ICD-10-CM

## 2024-11-21 PROCEDURE — 97140 MANUAL THERAPY 1/> REGIONS: CPT | Mod: GP | Performed by: PHYSICAL THERAPIST

## 2024-11-21 PROCEDURE — 97110 THERAPEUTIC EXERCISES: CPT | Mod: GP | Performed by: PHYSICAL THERAPIST

## 2024-11-21 ASSESSMENT — PAIN DESCRIPTION - DESCRIPTORS: DESCRIPTORS: ACHING

## 2024-11-21 ASSESSMENT — PAIN - FUNCTIONAL ASSESSMENT: PAIN_FUNCTIONAL_ASSESSMENT: 0-10

## 2024-11-21 ASSESSMENT — PAIN SCALES - GENERAL: PAINLEVEL_OUTOF10: 3

## 2024-11-21 NOTE — PROGRESS NOTES
Physical Therapy Treatment    Patient Name: Jese Roman  MRN: [id number]  Today's Date: 11/21/2024  Time Calculation  Start Time: 1148  Stop Time: 1231  Time Calculation (min): 43 min  PT Therapeutic Procedures Time Entry  Manual Therapy Time Entry: 8  Therapeutic Exercise Time Entry: 28  Therapeutic Activity Time Entry: 6       Current Problem  Problem List Items Addressed This Visit             ICD-10-CM       Neuro    Lumbar radiculopathy, chronic M54.16       Insurance:  Number of Treatments Authorized: 5/20  Certification Period Start Date: 09/26/24  Certification Period End Date: 12/27/24    Subjective   General  Reason for Referral: L lumbar radiculopathy to L glute/posterior thigh  Referred By: Julieta Bernard DO  Past Medical History Relevant to Rehab: Chiari Malformation Type I, migraines/headaches, RA  General Comment: Pt reports she's doing well - better overall. Morning time is still rough. Doing yoga every morning - light stretching.    Performing HEP?: Yes    Precautions  Precautions  STEADI Fall Risk Score (The score of 4 or more indicates an increased risk of falling): 0  Precautions Comment: none  Pain  Pain Assessment: 0-10  0-10 (Numeric) Pain Score: 3  Pain Type: Chronic pain  Pain Location: Back  Pain Orientation: Left  Pain Radiating Towards: L glute to back of the knee  Pain Descriptors: Aching  Pain Frequency: Constant/continuous    Objective    Relief with L QL elongation       Treatments:  Therapeutic Exercise  Therapeutic Exercise Activity 1: SciFit LE L2 [place] x 6 minutes  Therapeutic Exercise Activity 2: Dynamics: high knees, butt kicks, hip openers, hip closers, tin soldiers, toe swipes, x 40' each  Therapeutic Exercise Activity 3: TGL7 B LE squats 2 x 16  Therapeutic Exercise Activity 4: R,L hip diagonal ext x 10 reps each  Therapeutic Exercise Activity 5: *Reviewed pallof press  Therapeutic Exercise Activity 6: R/L hip extension alt standing x 1 min  Therapeutic Exercise  "Activity 7: H/L R/L alt march x 1 min  Therapeutic Exercise Activity 8: Dead bug isos x 20\"  Therapeutic Exercise Activity 9: *glute sets prior to scooting in bed    Therapeutic Activity  Therapeutic Activity 1: Squats x 5 reps  Therapeutic Activity 2: Squats decline board x 1 min    Manual Therapy  Manual Therapy Activity 1: Light L glute and piriformis release  Manual Therapy Activity 2: L QL elongation    OP EDUCATION:  Outpatient Education  Education Comment: Dead bug holds 60 sec prior to scissor kick exercises    Assessment:  PT Assessment  Assessment Comment: Pt showing good progress under POC. Relief with squatting on decline board vs ground. Pt challenged with hip strengthening but able to perform with less pain than prior sessions. Fatiguing with core exercises - educated on performance of dead bug holds prior to leg lowering exercises d/t increased LB strain.    Plan:  OP PT Plan  Treatment/Interventions: Education/ Instruction, Electrical stimulation, Gait training, Hot pack, Manual therapy, Neuromuscular re-education, Self care/ home management, Taping techniques, Therapeutic activities, Therapeutic exercises  PT Plan: Skilled PT (Hip and core strengthening, lumbar spine mobility with return to yoga and exercise for wellness)  PT Frequency: 1 time per week  Duration: 8-10 weeks, reducing frequency as goals are met  Onset Date: 03/01/24  Certification Period Start Date: 09/26/24  Certification Period End Date: 12/27/24  Number of Treatments Authorized: 5/20  Rehab Potential: Good  Plan of Care Agreement: Patient    Goals:  Active       L lumbar radiculopathy       STG/LTG       Start:  09/26/24    Expected End:  12/05/24       STG  1) Patient will improve Oswestry Low Back Disability Questionnaire by 10% in order to indicate a measurable improvement in daily function and ability to complete daily tasks in 4 weeks.  2) Patient will be able to complete ADLs with pain less than 4/10 in lumbar region in 4 " weeks.  3) Patient will have 75% of normal lumbar ROM in order to allow for proper bathing and dressing in 4 weeks.   4) Patient will be independent with HEP to allow for continued improvement in daily tasks at home and in the community in 3 visits.   LTG  1) Patient will have >/=4+/5 strength in lateral hip musculature to aid in stability with ambulation on varied surfaces in community in 8 weeks  2) Patient will be able to perform proper squatting technique and sitting postures in order to prevent increased pain with daily tasks in 8 weeks.  3) Patient will be able to perform >30 seconds of SLS on even ground in order to allow for safe ambulation and to demonstrate reduced fall risk within the community in 8 weeks.   4) Patient will improve Oswestry Low Back Disability Questionnaire to </=15% in order to indicate a measurable improvement in daily function and ability to complete daily tasks in 8 weeks.  5) Pt will get back to strength regimen for wellness to improve QOL by discharge.                   Jaylene Paris, PT

## 2024-12-05 ENCOUNTER — APPOINTMENT (OUTPATIENT)
Dept: PHYSICAL THERAPY | Facility: CLINIC | Age: 34
End: 2024-12-05
Payer: COMMERCIAL

## 2024-12-09 ENCOUNTER — APPOINTMENT (OUTPATIENT)
Dept: DERMATOLOGY | Facility: CLINIC | Age: 34
End: 2024-12-09
Payer: COMMERCIAL

## 2024-12-12 ENCOUNTER — APPOINTMENT (OUTPATIENT)
Dept: PHYSICAL THERAPY | Facility: CLINIC | Age: 34
End: 2024-12-12
Payer: COMMERCIAL

## 2024-12-12 DIAGNOSIS — M54.16 LUMBAR RADICULOPATHY, CHRONIC: ICD-10-CM

## 2024-12-23 ENCOUNTER — TELEPHONE (OUTPATIENT)
Dept: PRIMARY CARE | Facility: CLINIC | Age: 34
End: 2024-12-23
Payer: COMMERCIAL

## 2024-12-23 NOTE — TELEPHONE ENCOUNTER
Pt states her back is so bad today that she can barely stand and walk. She has taken 2000mg of ibu and 600mg of aleve without relief. She is wondering if you think she should go to the ER or try something else.

## 2024-12-23 NOTE — TELEPHONE ENCOUNTER
"[facility]  Home Care Following Anesthesia  For 24 hours after surgery:  1. Get plenty of rest.  A responsible adult must stay with you for at least 24 hours after you leave the surgery center.  2. Do not drive or use heavy equipment.  If you have weakness or tingling, don't drive or use heavy equipment until this feeling goes away.   3. Do not drink alcohol.   4. Avoid strenuous or risky activities.  Ask for help when climbing stairs.  5. You may feel lightheaded.  IF so, sit for a few minutes before standing.  Have someone help you get up.   6. If you have nausea (feel sick to your stomach): Drink only clear liquids such as apple juice, ginger ale, broth or 7-Up.  Rest may also help.  Be sure to drink enough fluids.  Move to a regular diet as you feel able.   7. You may have a slight fever.  Call the doctor if your fever is over 100 F (37.7 C) (taken under the tongue) or lasts longer than 24 hours.  8. You may have a dry mouth, a sore throat, muscle aches or trouble sleeping. These should go away after 24 hours.  9. Do not make important or legal decisions.   10. It is recommended to avoid smoking.        Today you received a Marcaine or bupivacaine block to numb the nerves near your surgery site.  This is a block using local anesthetic or \"numbing\" medication injected around the nerves to anesthetize or \"numb\" the area supplied by those nerves.  This block is injected into the muscle layer near your surgical site.  The medication may numb the location where you had surgery for 6-18 hours, but may last up to 24 hours.  If your surgical site is an arm or leg you should be careful with your affected limb, since it is possible to injure your limb without being aware of it due to the numbing.  Until full feeling returns, you should guard against bumping or hitting your limb, and avoid extreme hot or cold temperatures on the skin.  As the block wears off, the feeling will return as " I can do a muscle relaxer and steroid pack if she wants. If she just wants a strong pain pill for the next couple days, that is an option. We can do tramadol or low dose oxycodone. Will you see what she wants to try? a tingling or prickly sensation near your surgical site.  You will experience more discomfort from your incision as the feeling returns.  You may want to take a pain pill (a narcotic or Tylenol if this was prescribed by your surgeon) when you start to experience mild pain before the pain beccomes more severe.  If your pain medications do not control your pain you should notifiy your surgeon.    Tips for taking pain medications  To get the best pain relief possible, remember these points:    Take pain medications as directed, before pain becomes severe.    Pain medication can upset your stomach: taking it with food may help.    Constipation is a common side effect of pain medication. Drink plenty of  fluids.    Eat foods high in fiber. Take a stool softener if recommended by your doctor or pharmacist.    Do not drink alcohol, drive or operate machinery while taking pain medications.    Ask about other ways to control pain, such as with heat, ice or relaxation.    Tylenol/Acetaminophen Consumption  To help encourage the safe use of acetaminophen, the makers of TYLENOL  have lowered the maximum daily dose for single-ingredient Extra Strength TYLENOL  (acetaminophen) products sold in the [place] from 8 pills per day (4,000 mg) to 6 pills per day (3,000 mg). The dosing interval has also changed from 2 pills every 4-6 hours to 2 pills every 6 hours.    If you feel your pain relief is insufficient, you may take Tylenol/Acetaminophen in addition to your narcotic pain medication.     Be careful not to exceed 3,000 mg of Tylenol/Acetaminophen in a 24 hour period from all sources.    If you are taking extra strength Tylenol/acetaminophen (500 mg), the maximum dose is 6 tablets in 24 hours.    If you are taking regular strength acetaminophen (325 mg), the maximum dose is 9 tablets in 24 hours.    Call a doctor for any of the followin. Signs of infection (fever, growing tenderness at the surgery site, a large amount of  drainage or bleeding, severe pain, foul-smelling drainage, redness, swelling).  2. It has been over 8 to 10 hours since surgery and you are still not able to urinate (pass water).  3. Headache for over 24 hours.  4. Numbness, tingling or weakness the day after surgery (if you had spinal anesthesia).  5. Signs of Covid-19 infection (temperature over 100 degrees, shortness of breath, cough, loss of taste/smell, generalized body aches, persistent headache, chills, sore throat, nausea/vomiting/diarrhea)  Your doctor is:  Dr. Bernabe Cowart, Prostate and Urology: [phone]                  Or dial [phone] and ask for the resident on call for:  Prostate Urology  For emergency care, call the:  [facility]:  [phone] (TTY for hearing impaired: [phone])

## 2025-01-03 ENCOUNTER — APPOINTMENT (OUTPATIENT)
Dept: GASTROENTEROLOGY | Facility: CLINIC | Age: 35
End: 2025-01-03
Payer: COMMERCIAL

## 2025-01-06 ENCOUNTER — PHARMACY VISIT (OUTPATIENT)
Dept: PHARMACY | Facility: CLINIC | Age: 35
End: 2025-01-06
Payer: MEDICAID

## 2025-01-06 DIAGNOSIS — M54.16 LUMBAR RADICULOPATHY, CHRONIC: Primary | ICD-10-CM

## 2025-01-06 PROCEDURE — RXMED WILLOW AMBULATORY MEDICATION CHARGE

## 2025-01-06 RX ORDER — CYCLOBENZAPRINE HCL 10 MG
10 TABLET ORAL 3 TIMES DAILY PRN
Qty: 21 TABLET | Refills: 0 | Status: SHIPPED | OUTPATIENT
Start: 2025-01-06 | End: 2025-01-13

## 2025-01-06 RX ORDER — METHYLPREDNISOLONE 4 MG/1
TABLET ORAL
Qty: 21 TABLET | Refills: 0 | Status: SHIPPED | OUTPATIENT
Start: 2025-01-06 | End: 2025-01-12

## 2025-01-06 NOTE — TELEPHONE ENCOUNTER
Pt states she does not want anything with opiod in it but will try the steroid pack and muscle relaxer. Can you please send those to [facility] pharmacy as she is working today. She also got in with pain management.

## 2025-01-09 ENCOUNTER — PHARMACY VISIT (OUTPATIENT)
Dept: PHARMACY | Facility: CLINIC | Age: 35
End: 2025-01-09
Payer: MEDICAID

## 2025-01-09 ENCOUNTER — APPOINTMENT (OUTPATIENT)
Facility: CLINIC | Age: 35
End: 2025-01-09
Payer: COMMERCIAL

## 2025-01-09 VITALS
BODY MASS INDEX: 25.52 KG/M2 | DIASTOLIC BLOOD PRESSURE: 90 MMHG | HEIGHT: 63 IN | OXYGEN SATURATION: 98 % | SYSTOLIC BLOOD PRESSURE: 148 MMHG | RESPIRATION RATE: 18 BRPM | WEIGHT: 144 LBS

## 2025-01-09 DIAGNOSIS — M51.360 DISCOGENIC LUMBAR PAIN: Primary | ICD-10-CM

## 2025-01-09 PROCEDURE — RXMED WILLOW AMBULATORY MEDICATION CHARGE

## 2025-01-09 PROCEDURE — 1036F TOBACCO NON-USER: CPT | Performed by: STUDENT IN AN ORGANIZED HEALTH CARE EDUCATION/TRAINING PROGRAM

## 2025-01-09 PROCEDURE — 3008F BODY MASS INDEX DOCD: CPT | Performed by: STUDENT IN AN ORGANIZED HEALTH CARE EDUCATION/TRAINING PROGRAM

## 2025-01-09 PROCEDURE — 99203 OFFICE O/P NEW LOW 30 MIN: CPT | Performed by: STUDENT IN AN ORGANIZED HEALTH CARE EDUCATION/TRAINING PROGRAM

## 2025-01-09 RX ORDER — GABAPENTIN 300 MG/1
600 CAPSULE ORAL 3 TIMES DAILY
Qty: 180 CAPSULE | Refills: 2 | Status: SHIPPED | OUTPATIENT
Start: 2025-01-09 | End: 2025-04-09

## 2025-01-09 ASSESSMENT — PATIENT HEALTH QUESTIONNAIRE - PHQ9
1. LITTLE INTEREST OR PLEASURE IN DOING THINGS: NOT AT ALL
2. FEELING DOWN, DEPRESSED OR HOPELESS: NOT AT ALL
SUM OF ALL RESPONSES TO PHQ9 QUESTIONS 1 AND 2: 0

## 2025-01-09 ASSESSMENT — PAIN SCALES - GENERAL: PAINLEVEL_OUTOF10: 10-WORST PAIN EVER

## 2025-01-09 NOTE — PROGRESS NOTES
Subjective   Patient ID: Jese Roman is a 34 y.o. female who presents for New Patient Visit (Patient complains of severe shooting nerve pain from lower back through bilateral thighs. ).  HPI    Ms Jese Roman is a pleasant 34-year-old female who presents with complaints of lower back pain of approximately 2 and half weeks duration.  She has a past medical history significant for Chiari malformation that she follows with neurology for    The pain is described as bandlike in her lower back and radiating down the anterior and posterior aspect of her thighs to the level of her calves.  It is described as sharp and shooting with paresthesias.  At worst is a 10 out of 10 and at best it is a 6 out of 10.  The pain came on suddenly and she cannot member if she was doing any significant physical activity that led to this.  She woke up with this a couple weeks ago and has had pain since.    This patient has previously attempted conservative measures including over-the-counter medications such as NSAIDs, Tylenol, gabapentin, physical therapy, and massage therapy.    Imaging of the lumbar spine is available in the form of an x-ray.    OARRS:  No data recorded  I have personally reviewed the OARRS report for Jese Roman. I have considered the risks of abuse, dependence, addiction and diversion    Is the patient prescribed a combination of a benzodiazepine and opioid?  No      Review of Systems     Current Outpatient Medications   Medication Instructions    Ajovy Syringe 225 mg, subcutaneous, Every 30 days    cyclobenzaprine (FLEXERIL) 10 mg, oral, 3 times daily PRN    fremanezumab (Ajovy Syringe) 225 mg/1.5 mL prefilled syringe Inject 1.5 mL under the skin once a month    fremanezumab (Ajovy Syringe) 225 mg/1.5 mL prefilled syringe Inject 1.5 mL under the skin once a month    gabapentin (NEURONTIN) 600 mg, oral, 3 times daily    methylPREDNISolone (Medrol Dospak) 4 mg tablets Take as directed on  package. Take with food    valACYclovir (VALTREX) 500 mg, oral, Daily        Past Medical History:   Diagnosis Date    Anxiety     Arnold-Chiari malformation, type I (Multi) 09/05/2023    Arthritis     Cervical disc disorder     Chiari malformation type I (Multi)     Chronic pain disorder     Depression     Dry eyes     Dyspareunia, female     Extremity pain     Food intolerance     Headache, unspecified     Chronic headaches    Irritable bowel syndrome     Joint pain     Low back pain     Lumbosacral disc disease     Migraine     Neck pain     PTSD (post-traumatic stress disorder)     Toe laceration 11/28/2023    Vaginal polyp         Past Surgical History:   Procedure Laterality Date    OTHER SURGICAL HISTORY  09/19/2022    Throat surgery        Family History   Problem Relation Name Age of Onset    Arthritis Mother Dolores     Other (head aches) Mother Dolores     Alcohol abuse Mother Dolores     Depression Mother Dolores     Hypertension Mother Dolores     Hyperthyroidism Father Prince     Skin cancer Father Prince     Melanoma Father Prince     Alcohol abuse Father Prince     Cancer Father Prince     Depression Father Prince     Mental illness Father Prince     Chiari malformation Sister          Allergies   Allergen Reactions    Penicillins Anaphylaxis    Flu Vaccine [id number](6mos Up) Headache        No results found for this or any previous visit from the past 1000 days.      Objective     Vitals:    01/09/25 1344   BP: 148/90   Resp: 18   SpO2: 98%      Pain Score: 10-Worst pain ever        Physical Exam    GENERAL EXAM  Vital Signs: Vital signs to include heart rate, respiration rate, blood pressure, and temperature were reviewed.  General Appearance:  Awake, alert, healthy appearing, well developed, No acute distress.  Head: Normocephalic without evidence of head injury.  Neck: The appearance of the neck was normal without swelling with a midline trachea.  Eyes: The eyelids and eyebrows exhibited no abnormalities.  Pupils were  not pin-point.  Sclera was without icterus.  Lungs: Respiration rhythm and depth was normal.  Respiratory movements were normal without labored breathing.  Cardiovascular: No peripheral edema was present.    Neurological: Patient was oriented to time, place, and person.  Speech was normal.  Balance, gait, and stance were unremarkable.    Psychiatric: Appearance was normal with appropriate dress.  Mood was euthymic and affect was normal.  Skin: Affected regions were without ecchymosis or skin lesions.        Physical exam as above except:            Assessment/Plan   Problem List Items Addressed This Visit    None  Visit Diagnoses         Codes    Discogenic lumbar pain    -  Primary M51.360    Relevant Medications    gabapentin (Neurontin) 300 mg capsule        ASSESSMENT  Ms Jese Roman is a pleasant 34-year-old female who presents with complaints of lower back pain of approximately 2 and half weeks duration.  She has a past medical history significant for Chiari malformation that she follows with neurology for.  Overall the patient is very healthy and lives a healthy lifestyle, participating in yoga and frequent physical activity, as well as a very healthy diet.  The pain that she is experiencing significantly affects her life and her ability to live a healthy lifestyle    The pain is described as bandlike in her lower back and radiating down the anterior and posterior aspect of her thighs to the level of her calves.  It is described as sharp and shooting with paresthesias.  At worst is a 10 out of 10 and at best it is a 6 out of 10.  The pain came on suddenly and she cannot member if she was doing any significant physical activity that led to this.  She woke up with this a couple weeks ago and has had pain since.    This patient has previously attempted conservative measures including over-the-counter medications such as NSAIDs, Tylenol, gabapentin, physical therapy, and massage therapy.    Imaging of  the lumbar spine is available in the form of an x-ray.  This demonstrates moderate intervertebral height loss at L4-L5 as well as a facet joint arthrosis.  As well as marginal osteophytes at L4-L5    Physical exam is notable for decreased pinprick sensation along the L4 and L5 nerve dermatomes.    Discogenic lumbar spine pain  Spondylosis    PLAN  -The patient may benefit from a short course of gabapentin to decrease her pain.  This will be prescribed at 300 mg 3 times a day she may discontinue this after the epidural with the epidural provide significant effect.  - The patient may benefit from an L4-L5 interlaminar epidural steroid injection.  She is not currently on blood thinning medication.         This note was generated with the aid of dictation software, there may be typos despite my attempts at proofreading.

## 2025-01-09 NOTE — H&P (VIEW-ONLY)
Subjective   Patient ID: Jese Roman is a 34 y.o. female who presents for New Patient Visit (Patient complains of severe shooting nerve pain from lower back through bilateral thighs. ).  HPI    Ms Jese Roman is a pleasant 34-year-old female who presents with complaints of lower back pain of approximately 2 and half weeks duration.  She has a past medical history significant for Chiari malformation that she follows with neurology for    The pain is described as bandlike in her lower back and radiating down the anterior and posterior aspect of her thighs to the level of her calves.  It is described as sharp and shooting with paresthesias.  At worst is a 10 out of 10 and at best it is a 6 out of 10.  The pain came on suddenly and she cannot member if she was doing any significant physical activity that led to this.  She woke up with this a couple weeks ago and has had pain since.    This patient has previously attempted conservative measures including over-the-counter medications such as NSAIDs, Tylenol, gabapentin, physical therapy, and massage therapy.    Imaging of the lumbar spine is available in the form of an x-ray.    OARRS:  No data recorded  I have personally reviewed the OARRS report for Jese Roman. I have considered the risks of abuse, dependence, addiction and diversion    Is the patient prescribed a combination of a benzodiazepine and opioid?  No      Review of Systems     Current Outpatient Medications   Medication Instructions    Ajovy Syringe 225 mg, subcutaneous, Every 30 days    cyclobenzaprine (FLEXERIL) 10 mg, oral, 3 times daily PRN    fremanezumab (Ajovy Syringe) 225 mg/1.5 mL prefilled syringe Inject 1.5 mL under the skin once a month    fremanezumab (Ajovy Syringe) 225 mg/1.5 mL prefilled syringe Inject 1.5 mL under the skin once a month    gabapentin (NEURONTIN) 600 mg, oral, 3 times daily    methylPREDNISolone (Medrol Dospak) 4 mg tablets Take as directed on  package. Take with food    valACYclovir (VALTREX) 500 mg, oral, Daily        Past Medical History:   Diagnosis Date    Anxiety     Arnold-Chiari malformation, type I (Multi) 09/05/2023    Arthritis     Cervical disc disorder     Chiari malformation type I (Multi)     Chronic pain disorder     Depression     Dry eyes     Dyspareunia, female     Extremity pain     Food intolerance     Headache, unspecified     Chronic headaches    Irritable bowel syndrome     Joint pain     Low back pain     Lumbosacral disc disease     Migraine     Neck pain     PTSD (post-traumatic stress disorder)     Toe laceration 11/28/2023    Vaginal polyp         Past Surgical History:   Procedure Laterality Date    OTHER SURGICAL HISTORY  09/19/2022    Throat surgery        Family History   Problem Relation Name Age of Onset    Arthritis Mother Dolores     Other (head aches) Mother Dolores     Alcohol abuse Mother Dolores     Depression Mother Dolores     Hypertension Mother Dolores     Hyperthyroidism Father Prince     Skin cancer Father Prince     Melanoma Father Prince     Alcohol abuse Father Prince     Cancer Father Prince     Depression Father Prince     Mental illness Father Prince     Chiari malformation Sister          Allergies   Allergen Reactions    Penicillins Anaphylaxis    Flu Vaccine [id number](6mos Up) Headache        No results found for this or any previous visit from the past 1000 days.      Objective     Vitals:    01/09/25 1344   BP: 148/90   Resp: 18   SpO2: 98%      Pain Score: 10-Worst pain ever        Physical Exam    GENERAL EXAM  Vital Signs: Vital signs to include heart rate, respiration rate, blood pressure, and temperature were reviewed.  General Appearance:  Awake, alert, healthy appearing, well developed, No acute distress.  Head: Normocephalic without evidence of head injury.  Neck: The appearance of the neck was normal without swelling with a midline trachea.  Eyes: The eyelids and eyebrows exhibited no abnormalities.  Pupils were  not pin-point.  Sclera was without icterus.  Lungs: Respiration rhythm and depth was normal.  Respiratory movements were normal without labored breathing.  Cardiovascular: No peripheral edema was present.    Neurological: Patient was oriented to time, place, and person.  Speech was normal.  Balance, gait, and stance were unremarkable.    Psychiatric: Appearance was normal with appropriate dress.  Mood was euthymic and affect was normal.  Skin: Affected regions were without ecchymosis or skin lesions.        Physical exam as above except:            Assessment/Plan   Problem List Items Addressed This Visit    None  Visit Diagnoses         Codes    Discogenic lumbar pain    -  Primary M51.360    Relevant Medications    gabapentin (Neurontin) 300 mg capsule        ASSESSMENT  Ms Jese Roman is a pleasant 34-year-old female who presents with complaints of lower back pain of approximately 2 and half weeks duration.  She has a past medical history significant for Chiari malformation that she follows with neurology for.  Overall the patient is very healthy and lives a healthy lifestyle, participating in yoga and frequent physical activity, as well as a very healthy diet.  The pain that she is experiencing significantly affects her life and her ability to live a healthy lifestyle    The pain is described as bandlike in her lower back and radiating down the anterior and posterior aspect of her thighs to the level of her calves.  It is described as sharp and shooting with paresthesias.  At worst is a 10 out of 10 and at best it is a 6 out of 10.  The pain came on suddenly and she cannot member if she was doing any significant physical activity that led to this.  She woke up with this a couple weeks ago and has had pain since.    This patient has previously attempted conservative measures including over-the-counter medications such as NSAIDs, Tylenol, gabapentin, physical therapy, and massage therapy.    Imaging of  the lumbar spine is available in the form of an x-ray.  This demonstrates moderate intervertebral height loss at L4-L5 as well as a facet joint arthrosis.  As well as marginal osteophytes at L4-L5    Physical exam is notable for decreased pinprick sensation along the L4 and L5 nerve dermatomes.    Discogenic lumbar spine pain  Spondylosis    PLAN  -The patient may benefit from a short course of gabapentin to decrease her pain.  This will be prescribed at 300 mg 3 times a day she may discontinue this after the epidural with the epidural provide significant effect.  - The patient may benefit from an L4-L5 interlaminar epidural steroid injection.  She is not currently on blood thinning medication.         This note was generated with the aid of dictation software, there may be typos despite my attempts at proofreading.

## 2025-01-10 ENCOUNTER — TELEPHONE (OUTPATIENT)
Facility: CLINIC | Age: 35
End: 2025-01-10

## 2025-01-10 ENCOUNTER — APPOINTMENT (OUTPATIENT)
Dept: GASTROENTEROLOGY | Facility: CLINIC | Age: 35
End: 2025-01-10
Payer: COMMERCIAL

## 2025-01-12 DIAGNOSIS — M51.360 DISCOGENIC LUMBAR PAIN: Primary | ICD-10-CM

## 2025-01-20 ENCOUNTER — OFFICE VISIT (OUTPATIENT)
Dept: OBSTETRICS AND GYNECOLOGY | Facility: CLINIC | Age: 35
End: 2025-01-20
Payer: COMMERCIAL

## 2025-01-20 DIAGNOSIS — Z11.3 SCREENING EXAMINATION FOR STI: Primary | ICD-10-CM

## 2025-01-20 PROCEDURE — 1036F TOBACCO NON-USER: CPT | Performed by: MIDWIFE

## 2025-01-20 PROCEDURE — 87491 CHLMYD TRACH DNA AMP PROBE: CPT

## 2025-01-20 PROCEDURE — 99213 OFFICE O/P EST LOW 20 MIN: CPT | Performed by: MIDWIFE

## 2025-01-20 PROCEDURE — 87591 N.GONORRHOEAE DNA AMP PROB: CPT

## 2025-01-21 LAB
C TRACH RRNA SPEC QL NAA+PROBE: NEGATIVE
N GONORRHOEA DNA SPEC QL PROBE+SIG AMP: NEGATIVE

## 2025-01-31 ENCOUNTER — HOSPITAL ENCOUNTER (OUTPATIENT)
Dept: PAIN MEDICINE | Facility: HOSPITAL | Age: 35
Discharge: HOME | End: 2025-01-31
Payer: COMMERCIAL

## 2025-01-31 VITALS
BODY MASS INDEX: 26.4 KG/M2 | DIASTOLIC BLOOD PRESSURE: 84 MMHG | HEART RATE: 72 BPM | RESPIRATION RATE: 18 BRPM | HEIGHT: 63 IN | SYSTOLIC BLOOD PRESSURE: 125 MMHG | TEMPERATURE: 97.5 F | WEIGHT: 149 LBS | OXYGEN SATURATION: 96 %

## 2025-01-31 DIAGNOSIS — M51.360 DISCOGENIC LUMBAR PAIN: ICD-10-CM

## 2025-01-31 LAB — HCG UR QL IA.RAPID: NEGATIVE

## 2025-01-31 PROCEDURE — 2550000001 HC RX 255 CONTRASTS: Performed by: STUDENT IN AN ORGANIZED HEALTH CARE EDUCATION/TRAINING PROGRAM

## 2025-01-31 PROCEDURE — 62323 NJX INTERLAMINAR LMBR/SAC: CPT | Performed by: STUDENT IN AN ORGANIZED HEALTH CARE EDUCATION/TRAINING PROGRAM

## 2025-01-31 PROCEDURE — 81025 URINE PREGNANCY TEST: CPT

## 2025-01-31 PROCEDURE — 2500000004 HC RX 250 GENERAL PHARMACY W/ HCPCS (ALT 636 FOR OP/ED): Performed by: STUDENT IN AN ORGANIZED HEALTH CARE EDUCATION/TRAINING PROGRAM

## 2025-01-31 RX ORDER — METHYLPREDNISOLONE ACETATE 40 MG/ML
INJECTION, SUSPENSION INTRA-ARTICULAR; INTRALESIONAL; INTRAMUSCULAR; SOFT TISSUE AS NEEDED
Status: COMPLETED | OUTPATIENT
Start: 2025-01-31 | End: 2025-01-31

## 2025-01-31 RX ORDER — LIDOCAINE HYDROCHLORIDE 10 MG/ML
INJECTION, SOLUTION EPIDURAL; INFILTRATION; INTRACAUDAL; PERINEURAL AS NEEDED
Status: COMPLETED | OUTPATIENT
Start: 2025-01-31 | End: 2025-01-31

## 2025-01-31 RX ORDER — LIDOCAINE HYDROCHLORIDE 5 MG/ML
INJECTION, SOLUTION INFILTRATION; INTRAVENOUS AS NEEDED
Status: COMPLETED | OUTPATIENT
Start: 2025-01-31 | End: 2025-01-31

## 2025-01-31 RX ADMIN — IOHEXOL 4 ML: 240 INJECTION, SOLUTION INTRATHECAL; INTRAVASCULAR; INTRAVENOUS; ORAL at 09:28

## 2025-01-31 RX ADMIN — LIDOCAINE HYDROCHLORIDE 4 ML: 10 INJECTION, SOLUTION EPIDURAL; INFILTRATION; INTRACAUDAL; PERINEURAL at 09:28

## 2025-01-31 RX ADMIN — METHYLPREDNISOLONE ACETATE 40 MG: 40 INJECTION, SUSPENSION INTRA-ARTICULAR; INTRALESIONAL; INTRAMUSCULAR; SOFT TISSUE at 09:28

## 2025-01-31 RX ADMIN — LIDOCAINE HYDROCHLORIDE 3 ML: 5 INJECTION, SOLUTION INFILTRATION at 09:27

## 2025-01-31 ASSESSMENT — COLUMBIA-SUICIDE SEVERITY RATING SCALE - C-SSRS
6. HAVE YOU EVER DONE ANYTHING, STARTED TO DO ANYTHING, OR PREPARED TO DO ANYTHING TO END YOUR LIFE?: YES
4. HAVE YOU HAD THESE THOUGHTS AND HAD SOME INTENTION OF ACTING ON THEM?: YES
5. HAVE YOU STARTED TO WORK OUT OR WORKED OUT THE DETAILS OF HOW TO KILL YOURSELF? DO YOU INTEND TO CARRY OUT THIS PLAN?: NO
1. IN THE PAST MONTH, HAVE YOU WISHED YOU WERE DEAD OR WISHED YOU COULD GO TO SLEEP AND NOT WAKE UP?: YES
6. HAVE YOU EVER DONE ANYTHING, STARTED TO DO ANYTHING, OR PREPARED TO DO ANYTHING TO END YOUR LIFE?: NO
2. HAVE YOU ACTUALLY HAD ANY THOUGHTS OF KILLING YOURSELF?: YES

## 2025-01-31 ASSESSMENT — PATIENT HEALTH QUESTIONNAIRE - PHQ9
10. IF YOU CHECKED OFF ANY PROBLEMS, HOW DIFFICULT HAVE THESE PROBLEMS MADE IT FOR YOU TO DO YOUR WORK, TAKE CARE OF THINGS AT HOME, OR GET ALONG WITH OTHER PEOPLE: SOMEWHAT DIFFICULT
9. THOUGHTS THAT YOU WOULD BE BETTER OFF DEAD, OR OF HURTING YOURSELF: MORE THAN HALF THE DAYS
1. LITTLE INTEREST OR PLEASURE IN DOING THINGS: NEARLY EVERY DAY
5. POOR APPETITE OR OVEREATING: MORE THAN HALF THE DAYS
4. FEELING TIRED OR HAVING LITTLE ENERGY: NEARLY EVERY DAY
6. FEELING BAD ABOUT YOURSELF - OR THAT YOU ARE A FAILURE OR HAVE LET YOURSELF OR YOUR FAMILY DOWN: NEARLY EVERY DAY
SUM OF ALL RESPONSES TO PHQ QUESTIONS 1-9: 22
3. TROUBLE FALLING OR STAYING ASLEEP OR SLEEPING TOO MUCH: MORE THAN HALF THE DAYS
SUM OF ALL RESPONSES TO PHQ9 QUESTIONS 1 AND 2: 6
8. MOVING OR SPEAKING SO SLOWLY THAT OTHER PEOPLE COULD HAVE NOTICED. OR THE OPPOSITE, BEING SO FIGETY OR RESTLESS THAT YOU HAVE BEEN MOVING AROUND A LOT MORE THAN USUAL: SEVERAL DAYS
2. FEELING DOWN, DEPRESSED OR HOPELESS: NEARLY EVERY DAY
7. TROUBLE CONCENTRATING ON THINGS, SUCH AS READING THE NEWSPAPER OR WATCHING TELEVISION: NEARLY EVERY DAY

## 2025-01-31 ASSESSMENT — PAIN SCALES - GENERAL
PAINLEVEL_OUTOF10: 0 - NO PAIN
PAINLEVEL_OUTOF10: 6

## 2025-01-31 ASSESSMENT — PAIN - FUNCTIONAL ASSESSMENT
PAIN_FUNCTIONAL_ASSESSMENT: 0-10
PAIN_FUNCTIONAL_ASSESSMENT: 0-10

## 2025-01-31 ASSESSMENT — ENCOUNTER SYMPTOMS
LOSS OF SENSATION IN FEET: 1
DEPRESSION: 1
OCCASIONAL FEELINGS OF UNSTEADINESS: 1

## 2025-01-31 NOTE — INTERVAL H&P NOTE
H&P reviewed. The patient was examined and there are no changes to the H&P.    Heart and lung exam normal.  Patient discussed mental health state with Dr. Sanchez. Documentation in Dr. Sanchez note. Dr. Sanchez determined okay to proceed.   Discussed with patient importance of mental health and option of going to ED for further evaluation. Patient further talked about the self-harming thoughts. Per  policy patient cannot be forced to be seen in ED. Expressed to patient importance of taking the steps to be seen today. Patient stated she would come to ED if thoughts became worse.

## 2025-02-06 ASSESSMENT — DERMATOLOGY QUALITY OF LIFE (QOL) ASSESSMENT
RATE HOW BOTHERED YOU ARE BY SYMPTOMS OF YOUR SKIN PROBLEM (EG, ITCHING, STINGING BURNING, HURTING OR SKIN IRRITATION): 3
RATE HOW BOTHERED YOU ARE BY EFFECTS OF YOUR SKIN PROBLEMS ON YOUR ACTIVITIES (EG, GOING OUT, ACCOMPLISHING WHAT YOU WANT, WORK ACTIVITIES OR YOUR RELATIONSHIPS WITH OTHERS): 5
WHAT SINGLE SKIN CONDITION LISTED BELOW IS THE PATIENT ANSWERING THE QUALITY-OF-LIFE ASSESSMENT QUESTIONS ABOUT: ACNE
RATE HOW EMOTIONALLY BOTHERED YOU ARE BY YOUR SKIN PROBLEM (FOR EXAMPLE, WORRY, EMBARRASSMENT, FRUSTRATION): 6 - ALWAYS BOTHERED
WHAT SINGLE SKIN CONDITION LISTED BELOW IS THE PATIENT ANSWERING THE QUALITY-OF-LIFE ASSESSMENT QUESTIONS ABOUT: ACNE
RATE HOW BOTHERED YOU ARE BY EFFECTS OF YOUR SKIN PROBLEMS ON YOUR ACTIVITIES (EG, GOING OUT, ACCOMPLISHING WHAT YOU WANT, WORK ACTIVITIES OR YOUR RELATIONSHIPS WITH OTHERS): 5
DATE THE QUALITY-OF-LIFE ASSESSMENT WAS COMPLETED: 67242
RATE HOW BOTHERED YOU ARE BY SYMPTOMS OF YOUR SKIN PROBLEM (EG, ITCHING, STINGING BURNING, HURTING OR SKIN IRRITATION): 3
RATE HOW EMOTIONALLY BOTHERED YOU ARE BY YOUR SKIN PROBLEM (FOR EXAMPLE, WORRY, EMBARRASSMENT, FRUSTRATION): 6 - ALWAYS BOTHERED

## 2025-02-06 ASSESSMENT — PATIENT GLOBAL ASSESSMENT (PGA): WHAT IS THE PGA: PATIENT GLOBAL ASSESSMENT:  3 - MODERATE

## 2025-02-10 ENCOUNTER — APPOINTMENT (OUTPATIENT)
Dept: DERMATOLOGY | Facility: CLINIC | Age: 35
End: 2025-02-10
Payer: COMMERCIAL

## 2025-02-10 DIAGNOSIS — D22.9 MELANOCYTIC NEVUS, UNSPECIFIED LOCATION: ICD-10-CM

## 2025-02-10 DIAGNOSIS — Z12.83 ENCOUNTER FOR SCREENING FOR MALIGNANT NEOPLASM OF SKIN: Primary | ICD-10-CM

## 2025-02-10 DIAGNOSIS — L70.5 EXCORIATED ACNE: ICD-10-CM

## 2025-02-10 PROCEDURE — 1036F TOBACCO NON-USER: CPT | Performed by: NURSE PRACTITIONER

## 2025-02-10 PROCEDURE — 99204 OFFICE O/P NEW MOD 45 MIN: CPT | Performed by: NURSE PRACTITIONER

## 2025-02-10 PROCEDURE — RXMED WILLOW AMBULATORY MEDICATION CHARGE

## 2025-02-10 RX ORDER — TRETINOIN 0.25 MG/G
CREAM TOPICAL
Qty: 45 G | Refills: 5 | Status: SHIPPED | OUTPATIENT
Start: 2025-02-10

## 2025-02-10 RX ORDER — BENZOYL PEROXIDE 50 MG/ML
LIQUID TOPICAL
Qty: 227 G | Refills: 11 | Status: SHIPPED | OUTPATIENT
Start: 2025-02-10

## 2025-02-10 RX ORDER — DOXYCYCLINE 50 MG/1
50 CAPSULE ORAL 2 TIMES DAILY
Qty: 60 CAPSULE | Refills: 1 | Status: SHIPPED | OUTPATIENT
Start: 2025-02-10 | End: 2025-04-11

## 2025-02-10 NOTE — PROGRESS NOTES
Subjective     Jese Roman is a 34 y.o. female who presents for the following: Skin Check (Pt presents to office for full skin exam.  Last full skin exam never.  Pt has family history of NMSC.  Pt has scattered lesions of concern at this time. Chaperone offered and declined.//).     Review of Systems:  No other skin or systemic complaints other than what is documented elsewhere in the note.    The following portions of the chart were reviewed this encounter and updated as appropriate:  Tobacco  Allergies  Meds  Problems  Med Hx  Surg Hx  Fam Hx         Skin Cancer History  No skin cancer on file.      Specialty Problems    None       Objective   Well appearing patient in no apparent distress; mood and affect are within normal limits.    A full examination was performed including scalp, head, eyes, ears, nose, lips, neck, chest, axillae, abdomen, back, buttocks, bilateral upper extremities, bilateral lower extremities, hands, feet, fingers, toes, fingernails, and toenails. All findings within normal limits unless otherwise noted below.    Assessment/Plan   1. Encounter for screening for malignant neoplasm of skin  Scattered benign lesions    - Protective measures, such as avoiding skin exposure to sunlight during peak sun hours (10 AM to 3 PM), wearing protective clothing, and applying high-SPF sunscreen, are essential for reducing exposure to harmful ultraviolet (UV) light.  - Monthly self-examination of the skin is helpful to detect new lesions or changes in existing lesions.  - Discussed signs and symptoms of sun-related skin cancers.   - Make sure your moles are not signs of skin cancer (melanoma). Remember the ABCDEs of melanoma lesions:  A - Asymmetry: One half of the lesion does not mirror the other half.  B - Border: The borders are irregular or vague (indistinct).  C - Color: More than one color may be noted within the mole.  D - Diameter: Size greater than 6 mm (roughly the size of a pencil  eraser) may be concerning.  E - Evolving: Notable changes in the lesion over time are suspicious signs for skin cancer.    2. Melanocytic nevus, unspecified location  Uniform pigmented macule(s)/papule(s) with reassuring findings on dermoscopy    -Discussed nature of condition  -Reassurance, benign-appearing features on examination today  -Recommend continued observation    3. Excoriated acne  Scattered open and closed comedones and erythematous inflammatory papules, and pustules. Many lesions are excoriated, admits to stress/nervous picking.     -Discussed the nature of the condition  -Discussed treatment options  -Recommend to begin topical retinoids; if just starting therapy with retinoid, start application of a very thin layer three nights per week as tolerated. If extreme redness or dryness occurs, hold medication until resolved and then restart at a greater interval. May apply moisturizer after application of retinoid. Advance toward nightly use as tolerated  -Recommend: Start BP wash and tretinoin cream, use  as directed.   - Start doxycycline, take as directed.   - Risks, benefits, and side effects discussed. Patient understood and agrees with the plan.       Related Medications  benzoyl peroxide 5 % external wash  Daily in shower to affected areas    doxycycline (Monodox) 50 mg capsule  Take 1 capsule (50 mg) by mouth 2 times a day. Take with at least 8 ounces (large glass) of water, do not lie down for 30 minutes after    tretinoin (Retin-A) 0.025 % cream  Apply a pea-size amount to entire face at bedtime. Decrease use if too drying.      Follow up in 3 months for acne. Please call me if there are any changes or development of concerning symptoms (lesion/skin condition is changing, bleeding, enlarging, or worsening).

## 2025-02-11 ENCOUNTER — APPOINTMENT (OUTPATIENT)
Dept: ORTHOPEDIC SURGERY | Facility: CLINIC | Age: 35
End: 2025-02-11
Payer: COMMERCIAL

## 2025-02-11 DIAGNOSIS — M54.16 LUMBAR RADICULOPATHY, CHRONIC: Primary | ICD-10-CM

## 2025-02-11 PROCEDURE — 99204 OFFICE O/P NEW MOD 45 MIN: CPT | Performed by: PHYSICIAN ASSISTANT

## 2025-02-11 PROCEDURE — 1036F TOBACCO NON-USER: CPT | Performed by: PHYSICIAN ASSISTANT

## 2025-02-11 NOTE — PROGRESS NOTES
Chief Complaint: Lumbar radiculopathy    HPI: Jese Roman is a 34 y.o. female patient presenting with chronic low back pain.  She has always dealt with some aspect of low back pain.  Last February she fell off of a tall chair which worsened her symptoms.  She has midline low back pain that radiates into beltline distribution.  She has pain radiating down her legs, left worse than right.  The majority of her symptoms are left-sided.  She feels like she has a constant cramping in her left leg.  The pain radiates down her posterior left leg into her pinky toe.  Her symptoms are increased with standing and walking.  She denies weakness, dragging or tripping over her feet.  She has full control of her bowel and bladder.      Treatment thus far has consisted of physical therapy and chiropractic care.  She did physical therapy from October through December and she has continued with her at home exercise program.  She had an epidural injection with Dr. Sacnhez on 1/31.  This significantly improved her symptoms, not completely resolved.      No anticoagulations  Negative tobacco use    Review of Systems    All other systems have been reviewed and are negative for complaint. All pertinent positive and negative as listed in history of present illness.    Past Medical History:   Diagnosis Date    Anxiety     Arnold-Chiari malformation, type I (Multi) 09/05/2023    Arthritis     Cervical disc disorder     Chiari malformation type I (Multi)     Chronic pain disorder     Depression     Dry eyes     Dyspareunia, female     Extremity pain     Food intolerance     Headache, unspecified     Chronic headaches    Irritable bowel syndrome     Joint pain     Low back pain     Lumbosacral disc disease     Migraine     Neck pain     PTSD (post-traumatic stress disorder)     Toe laceration 11/28/2023    Vaginal polyp         Past Surgical History:   Procedure Laterality Date    OTHER SURGICAL HISTORY  09/19/2022    Throat surgery         Allergies   Allergen Reactions    Penicillins Anaphylaxis    Flu Vaccine [id number](6mos Up) Headache        Current Outpatient Medications on File Prior to Visit   Medication Sig Dispense Refill    benzoyl peroxide 5 % external wash Daily in shower to affected areas 227 g 11    cyclobenzaprine (Flexeril) 10 mg tablet Take 1 tablet (10 mg) by mouth 3 times a day as needed for muscle spasms for up to 7 days. 21 tablet 0    doxycycline (Monodox) 50 mg capsule Take 1 capsule (50 mg) by mouth 2 times a day. Take with at least 8 ounces (large glass) of water, do not lie down for 30 minutes after. Take with food 60 capsule 1    fremanezumab (Ajovy Syringe) 225 mg/1.5 mL prefilled syringe Inject 1.5 mL under the skin once a month (Patient not taking: Reported on 1/9/2025) 4.5 mL 3    fremanezumab (Ajovy Syringe) 225 mg/1.5 mL prefilled syringe Inject 1.5 mL (225 mg) under the skin every 30 (thirty) days. (Patient not taking: Reported on 1/9/2025) 4.5 mL 3    fremanezumab (Ajovy Syringe) 225 mg/1.5 mL prefilled syringe Inject 1.5 mL under the skin once a month (Patient not taking: Reported on 1/9/2025) 3 mL 0    gabapentin (Neurontin) 300 mg capsule Take 2 capsules (600 mg) by mouth 3 times a day. 180 capsule 2    tretinoin (Retin-A) 0.025 % cream Apply a pea-size amount to entire face at bedtime. Decrease use if too drying. 45 g 5    valACYclovir (Valtrex) 500 mg tablet Take 1 tablet (500 mg) by mouth once daily. (Patient not taking: Reported on 2/10/2025) 90 tablet 3     No current facility-administered medications on file prior to visit.          PE:   Const: Well-appearing, well-nourished female in no distress.  Eyes: Normal appearing sclera and conjunctiva, no jaundice, pupils normal in appearance.  Resp: breathing comfortably, normal respiratory rate.  CV: No upper or lower extremity edema.  Musculoskeletal: Normal gait.  Lumbar ROM is supple.  Strength exam of the lower extremities reveals 5/5 strength in all  major muscle groups.  Negative straight leg raise bilaterally.  Neuro: Sensation is intact and equal bilaterally. Deep tendon reflexes are normal and symmetric.  No clonus.  Skin: Intact without any lesions, normal turgor.  Psych: Alert and oriented x3, normal mood and affect.        Imaging:  I personally reviewed x-rays of the lumbar spine completed on 10/28. There is no evidence of acute fracture or instability.  Mild degenerative changes of the lumbar spine.      A/P:    The plan is to further evaluate her chronic low back pain with new onset of radiculopathy.  Symptoms failed to resolve with physical therapy and 1 epidural injection.  An MRI of the lumbar spine was ordered today.  She can follow-up with me once her MRI is complete to discuss further treatment options, possibly including surgical intervention.    **This note was dictated using speech recognition software and was not corrected for spelling or grammatical errors**

## 2025-02-12 ENCOUNTER — PHARMACY VISIT (OUTPATIENT)
Dept: PHARMACY | Facility: CLINIC | Age: 35
End: 2025-02-12
Payer: MEDICAID

## 2025-02-13 ENCOUNTER — APPOINTMENT (OUTPATIENT)
Facility: CLINIC | Age: 35
End: 2025-02-13
Payer: COMMERCIAL

## 2025-02-19 ENCOUNTER — APPOINTMENT (OUTPATIENT)
Dept: OBSTETRICS AND GYNECOLOGY | Facility: CLINIC | Age: 35
End: 2025-02-19
Payer: COMMERCIAL

## 2025-02-20 ENCOUNTER — APPOINTMENT (OUTPATIENT)
Facility: CLINIC | Age: 35
End: 2025-02-20
Payer: COMMERCIAL

## 2025-02-20 VITALS
SYSTOLIC BLOOD PRESSURE: 135 MMHG | RESPIRATION RATE: 15 BRPM | HEIGHT: 63 IN | OXYGEN SATURATION: 99 % | TEMPERATURE: 98.1 F | HEART RATE: 108 BPM | WEIGHT: 149 LBS | DIASTOLIC BLOOD PRESSURE: 90 MMHG | BODY MASS INDEX: 26.4 KG/M2

## 2025-02-20 DIAGNOSIS — M51.26 DISC DISPLACEMENT, LUMBAR: Primary | ICD-10-CM

## 2025-02-20 DIAGNOSIS — M54.16 LUMBAR RADICULOPATHY, CHRONIC: ICD-10-CM

## 2025-02-20 PROCEDURE — 1036F TOBACCO NON-USER: CPT | Performed by: STUDENT IN AN ORGANIZED HEALTH CARE EDUCATION/TRAINING PROGRAM

## 2025-02-20 PROCEDURE — 99212 OFFICE O/P EST SF 10 MIN: CPT | Performed by: STUDENT IN AN ORGANIZED HEALTH CARE EDUCATION/TRAINING PROGRAM

## 2025-02-20 PROCEDURE — 3008F BODY MASS INDEX DOCD: CPT | Performed by: STUDENT IN AN ORGANIZED HEALTH CARE EDUCATION/TRAINING PROGRAM

## 2025-02-20 RX ORDER — DIAZEPAM 5 MG/1
5 TABLET ORAL ONCE AS NEEDED
OUTPATIENT
Start: 2025-02-20

## 2025-02-20 ASSESSMENT — ENCOUNTER SYMPTOMS: BACK PAIN: 1

## 2025-02-20 ASSESSMENT — PAIN SCALES - GENERAL: PAINLEVEL_OUTOF10: 4

## 2025-02-20 NOTE — PROGRESS NOTES
Subjective   Patient ID: Jese Roman is a 34 y.o. female who presents for Follow-up (Pt here for a follow up post  L4-L5 interlaminar epidural steroid injection. Pt states she is doing much better since injection.).  HPI  Ms Jese Roman is a pleasant 34-year-old female who presents with complaints of lower back pain of approximately 2 and half weeks duration.  She has a past medical history significant for Chiari malformation that she follows with neurology for.  She is here as a follow up visit following a L4-5 L CLAUDINE.  Overall the patient is very healthy and lives a healthy lifestyle, participating in yoga and frequent physical activity, as well as a very healthy diet.  The pain that she is experiencing significantly affects her life and her ability to live a healthy lifestyle     The pain is described as bandlike in her lower back and radiating down the anterior and posterior aspect of her thighs to the level of her calves. It is described as sharp and shooting with paresthesias. At worst is a 10 out of 10 and at best it is a 6 out of 10. The pain came on suddenly and she cannot member if she was doing any significant physical activity that led to this. She woke up with this a couple weeks ago and has had pain since.     She is very satisfied with the procedure and reports approximately 60-80% pain relief. However, in the past few days she has started to experience pain again.    OARRS:  No data recorded  I have personally reviewed the OARRS report for Jese Roman. I have considered the risks of abuse, dependence, addiction and diversion        Review of Systems   Musculoskeletal:  Positive for back pain.   All other systems reviewed and are negative.       Current Outpatient Medications   Medication Instructions    benzoyl peroxide 5 % external wash Daily in shower to affected areas    doxycycline (MONODOX) 50 mg, oral, 2 times daily, Take with at least 8 ounces (large glass) of water,  do not lie down for 30 minutes after. Take with food    tretinoin (Retin-A) 0.025 % cream Apply a pea-size amount to entire face at bedtime. Decrease use if too drying.    valACYclovir (VALTREX) 500 mg, oral, Daily        Past Medical History:   Diagnosis Date    Acne     Anxiety     Arnold-Chiari malformation, type I (Multi) 09/05/2023    Arthritis     Cervical disc disorder     Chiari malformation type I (Multi)     Chronic pain disorder     Depression     Dry eyes     Dyspareunia, female     Extremity pain     Food intolerance     Headache, unspecified     Chronic headaches    Irritable bowel syndrome     Joint pain     Low back pain     Lumbosacral disc disease     Migraine     Neck pain     PTSD (post-traumatic stress disorder)     Toe laceration 11/28/2023    Vaginal polyp         Past Surgical History:   Procedure Laterality Date    OTHER SURGICAL HISTORY  09/19/2022    Throat surgery    WISDOM TOOTH EXTRACTION          Family History   Problem Relation Name Age of Onset    Arthritis Mother Dolores     Other (head aches) Mother Dolores     Alcohol abuse Mother Dolores     Depression Mother Dolores     Hypertension Mother Dolores     Cancer Mother Dolores     Mental illness Mother Dolores     Hyperthyroidism Father Prince     Skin cancer Father Prince     Melanoma Father Prince     Alcohol abuse Father Prince     Cancer Father Prince     Depression Father Prince     Mental illness Father Prince     Acne Father Prince     Chiari malformation Sister      Breast cancer Paternal Grandmother Aubrie         Allergies   Allergen Reactions    Penicillins Anaphylaxis    Gabapentin Dizziness    Flu Vaccine [id number](6mos Up) Headache        No results found for this or any previous visit from the past 1000 days.      Objective     Vitals:    02/20/25 1251   BP: 135/90   Pulse: 108   Resp: 15   Temp: 36.7 °C (98.1 °F)   SpO2: 99%               Physical Exam    GENERAL EXAM  Vital Signs: Vital signs to include heart rate, respiration rate, blood  pressure, and temperature were reviewed.  General Appearance:  Awake, alert, healthy appearing, well developed, No acute distress.  Head: Normocephalic without evidence of head injury.  Neck: The appearance of the neck was normal without swelling with a midline trachea.  Eyes: The eyelids and eyebrows exhibited no abnormalities.  Pupils were not pin-point.  Sclera was without icterus.  Lungs: Respiration rhythm and depth was normal.  Respiratory movements were normal without labored breathing.  Cardiovascular: No peripheral edema was present.    Neurological: Patient was oriented to time, place, and person.  Speech was normal.  Balance, gait, and stance were unremarkable.    Psychiatric: Appearance was normal with appropriate dress.  Mood was euthymic and affect was normal.  Skin: Affected regions were without ecchymosis or skin lesions.        Physical exam as above except:  Straight leg raise positive  Pain palpation of paraspinal musculature  decreased pinprick sensation along the L4 and L5 nerve dermatomes.     Assessment/Plan   Problem List Items Addressed This Visit             ICD-10-CM    Lumbar radiculopathy, chronic M54.16    Disc displacement, lumbar - Primary M51.26    Relevant Orders    [place] pain management    Transforaminal   ASSESSMENT  Ms Jese Roman is a pleasant 34-year-old female who presents with complaints of lower back pain of approximately 2 and half weeks duration.  She has a past medical history significant for Chiari malformation that she follows with neurology for.  She is here as a follow up visit following a L4-5 L CLAUDINE.  Overall the patient is very healthy and lives a healthy lifestyle, participating in yoga and frequent physical activity, as well as a very healthy diet.  The pain that she is experiencing significantly affects her life and her ability to live a healthy lifestyle     The pain is described as bandlike in her lower back and radiating down the anterior and posterior  aspect of her thighs to the level of her calves. It is described as sharp and shooting with paresthesias. At worst is a 10 out of 10 and at best it is a 6 out of 10. The pain came on suddenly and she cannot member if she was doing any significant physical activity that led to this. She woke up with this a couple weeks ago and has had pain since.     She is very satisfied with the procedure and reports approximately 60-80% pain relief. However, in the past few days she has started to experience pain again.    Imaging:  MRI of the lumbar spine is ordered and awaiting results    Physical exam:  Straight leg raise positive  Pain palpation of paraspinal musculature  decreased pinprick sensation along the L4 and L5 nerve dermatomes.     Diagnosis  Lumbar disc displacement with radicular symptoms    PLAN  -The patient benefit from a lumbar transforaminal epidural steroid injection, bilateral, at L4 with Depo-Medrol. She is not currently on blood thinning medications.           This note was generated with the aid of dictation software, there may be typos despite my attempts at proofreading.

## 2025-02-20 NOTE — H&P (VIEW-ONLY)
Subjective   Patient ID: Jese Roman is a 34 y.o. female who presents for Follow-up (Pt here for a follow up post  L4-L5 interlaminar epidural steroid injection. Pt states she is doing much better since injection.).  HPI  Ms Jese Roman is a pleasant 34-year-old female who presents with complaints of lower back pain of approximately 2 and half weeks duration.  She has a past medical history significant for Chiari malformation that she follows with neurology for.  She is here as a follow up visit following a L4-5 L CLAUDINE.  Overall the patient is very healthy and lives a healthy lifestyle, participating in yoga and frequent physical activity, as well as a very healthy diet.  The pain that she is experiencing significantly affects her life and her ability to live a healthy lifestyle     The pain is described as bandlike in her lower back and radiating down the anterior and posterior aspect of her thighs to the level of her calves. It is described as sharp and shooting with paresthesias. At worst is a 10 out of 10 and at best it is a 6 out of 10. The pain came on suddenly and she cannot member if she was doing any significant physical activity that led to this. She woke up with this a couple weeks ago and has had pain since.     She is very satisfied with the procedure and reports approximately 60-80% pain relief. However, in the past few days she has started to experience pain again.    OARRS:  No data recorded  I have personally reviewed the OARRS report for Jese Roman. I have considered the risks of abuse, dependence, addiction and diversion        Review of Systems   Musculoskeletal:  Positive for back pain.   All other systems reviewed and are negative.       Current Outpatient Medications   Medication Instructions    benzoyl peroxide 5 % external wash Daily in shower to affected areas    doxycycline (MONODOX) 50 mg, oral, 2 times daily, Take with at least 8 ounces (large glass) of water,  do not lie down for 30 minutes after. Take with food    tretinoin (Retin-A) 0.025 % cream Apply a pea-size amount to entire face at bedtime. Decrease use if too drying.    valACYclovir (VALTREX) 500 mg, oral, Daily        Past Medical History:   Diagnosis Date    Acne     Anxiety     Arnold-Chiari malformation, type I (Multi) 09/05/2023    Arthritis     Cervical disc disorder     Chiari malformation type I (Multi)     Chronic pain disorder     Depression     Dry eyes     Dyspareunia, female     Extremity pain     Food intolerance     Headache, unspecified     Chronic headaches    Irritable bowel syndrome     Joint pain     Low back pain     Lumbosacral disc disease     Migraine     Neck pain     PTSD (post-traumatic stress disorder)     Toe laceration 11/28/2023    Vaginal polyp         Past Surgical History:   Procedure Laterality Date    OTHER SURGICAL HISTORY  09/19/2022    Throat surgery    WISDOM TOOTH EXTRACTION          Family History   Problem Relation Name Age of Onset    Arthritis Mother Dolores     Other (head aches) Mother Dolores     Alcohol abuse Mother Dolores     Depression Mother Dolores     Hypertension Mother Dolores     Cancer Mother Dolores     Mental illness Mother Dolores     Hyperthyroidism Father Prince     Skin cancer Father Prince     Melanoma Father Prince     Alcohol abuse Father Prince     Cancer Father Prince     Depression Father Prince     Mental illness Father Prince     Acne Father Prince     Chiari malformation Sister      Breast cancer Paternal Grandmother Aubrie         Allergies   Allergen Reactions    Penicillins Anaphylaxis    Gabapentin Dizziness    Flu Vaccine [id number](6mos Up) Headache        No results found for this or any previous visit from the past 1000 days.      Objective     Vitals:    02/20/25 1251   BP: 135/90   Pulse: 108   Resp: 15   Temp: 36.7 °C (98.1 °F)   SpO2: 99%               Physical Exam    GENERAL EXAM  Vital Signs: Vital signs to include heart rate, respiration rate, blood  pressure, and temperature were reviewed.  General Appearance:  Awake, alert, healthy appearing, well developed, No acute distress.  Head: Normocephalic without evidence of head injury.  Neck: The appearance of the neck was normal without swelling with a midline trachea.  Eyes: The eyelids and eyebrows exhibited no abnormalities.  Pupils were not pin-point.  Sclera was without icterus.  Lungs: Respiration rhythm and depth was normal.  Respiratory movements were normal without labored breathing.  Cardiovascular: No peripheral edema was present.    Neurological: Patient was oriented to time, place, and person.  Speech was normal.  Balance, gait, and stance were unremarkable.    Psychiatric: Appearance was normal with appropriate dress.  Mood was euthymic and affect was normal.  Skin: Affected regions were without ecchymosis or skin lesions.        Physical exam as above except:  Straight leg raise positive  Pain palpation of paraspinal musculature  decreased pinprick sensation along the L4 and L5 nerve dermatomes.     Assessment/Plan   Problem List Items Addressed This Visit             ICD-10-CM    Lumbar radiculopathy, chronic M54.16    Disc displacement, lumbar - Primary M51.26    Relevant Orders    [place] pain management    Transforaminal   ASSESSMENT  Ms Jese Roman is a pleasant 34-year-old female who presents with complaints of lower back pain of approximately 2 and half weeks duration.  She has a past medical history significant for Chiari malformation that she follows with neurology for.  She is here as a follow up visit following a L4-5 L CLAUDINE.  Overall the patient is very healthy and lives a healthy lifestyle, participating in yoga and frequent physical activity, as well as a very healthy diet.  The pain that she is experiencing significantly affects her life and her ability to live a healthy lifestyle     The pain is described as bandlike in her lower back and radiating down the anterior and posterior  aspect of her thighs to the level of her calves. It is described as sharp and shooting with paresthesias. At worst is a 10 out of 10 and at best it is a 6 out of 10. The pain came on suddenly and she cannot member if she was doing any significant physical activity that led to this. She woke up with this a couple weeks ago and has had pain since.     She is very satisfied with the procedure and reports approximately 60-80% pain relief. However, in the past few days she has started to experience pain again.    Imaging:  MRI of the lumbar spine is ordered and awaiting results    Physical exam:  Straight leg raise positive  Pain palpation of paraspinal musculature  decreased pinprick sensation along the L4 and L5 nerve dermatomes.     Diagnosis  Lumbar disc displacement with radicular symptoms    PLAN  -The patient benefit from a lumbar transforaminal epidural steroid injection, bilateral, at L4 with Depo-Medrol. She is not currently on blood thinning medications.           This note was generated with the aid of dictation software, there may be typos despite my attempts at proofreading.

## 2025-02-25 PROCEDURE — RXMED WILLOW AMBULATORY MEDICATION CHARGE

## 2025-02-28 ENCOUNTER — APPOINTMENT (OUTPATIENT)
Dept: RADIOLOGY | Facility: HOSPITAL | Age: 35
End: 2025-02-28
Payer: COMMERCIAL

## 2025-02-28 ENCOUNTER — HOSPITAL ENCOUNTER (OUTPATIENT)
Dept: RADIOLOGY | Facility: HOSPITAL | Age: 35
Discharge: HOME | End: 2025-02-28
Payer: COMMERCIAL

## 2025-02-28 DIAGNOSIS — M54.16 LUMBAR RADICULOPATHY, CHRONIC: ICD-10-CM

## 2025-02-28 PROCEDURE — 72148 MRI LUMBAR SPINE W/O DYE: CPT

## 2025-03-05 ENCOUNTER — PHARMACY VISIT (OUTPATIENT)
Dept: PHARMACY | Facility: CLINIC | Age: 35
End: 2025-03-05
Payer: MEDICAID

## 2025-03-07 ENCOUNTER — HOSPITAL ENCOUNTER (OUTPATIENT)
Dept: PAIN MEDICINE | Facility: HOSPITAL | Age: 35
Discharge: HOME | End: 2025-03-07
Payer: COMMERCIAL

## 2025-03-07 VITALS
RESPIRATION RATE: 18 BRPM | OXYGEN SATURATION: 100 % | DIASTOLIC BLOOD PRESSURE: 82 MMHG | SYSTOLIC BLOOD PRESSURE: 121 MMHG | TEMPERATURE: 98.2 F | HEART RATE: 65 BPM

## 2025-03-07 DIAGNOSIS — M51.26 DISC DISPLACEMENT, LUMBAR: ICD-10-CM

## 2025-03-07 LAB — HCG UR QL IA.RAPID: NEGATIVE

## 2025-03-07 PROCEDURE — 2550000001 HC RX 255 CONTRASTS: Performed by: STUDENT IN AN ORGANIZED HEALTH CARE EDUCATION/TRAINING PROGRAM

## 2025-03-07 PROCEDURE — 81025 URINE PREGNANCY TEST: CPT | Performed by: PHYSICIAN ASSISTANT

## 2025-03-07 PROCEDURE — 2500000004 HC RX 250 GENERAL PHARMACY W/ HCPCS (ALT 636 FOR OP/ED): Performed by: STUDENT IN AN ORGANIZED HEALTH CARE EDUCATION/TRAINING PROGRAM

## 2025-03-07 PROCEDURE — 64483 NJX AA&/STRD TFRM EPI L/S 1: CPT | Mod: 50 | Performed by: STUDENT IN AN ORGANIZED HEALTH CARE EDUCATION/TRAINING PROGRAM

## 2025-03-07 RX ORDER — DIAZEPAM 5 MG/1
5 TABLET ORAL ONCE AS NEEDED
Status: DISCONTINUED | OUTPATIENT
Start: 2025-03-07 | End: 2025-03-08 | Stop reason: HOSPADM

## 2025-03-07 RX ORDER — LIDOCAINE HYDROCHLORIDE 20 MG/ML
INJECTION, SOLUTION EPIDURAL; INFILTRATION; INTRACAUDAL; PERINEURAL AS NEEDED
Status: COMPLETED | OUTPATIENT
Start: 2025-03-07 | End: 2025-03-07

## 2025-03-07 RX ORDER — LIDOCAINE HYDROCHLORIDE 5 MG/ML
INJECTION, SOLUTION INFILTRATION; INTRAVENOUS AS NEEDED
Status: COMPLETED | OUTPATIENT
Start: 2025-03-07 | End: 2025-03-07

## 2025-03-07 RX ORDER — METHYLPREDNISOLONE ACETATE 40 MG/ML
INJECTION, SUSPENSION INTRA-ARTICULAR; INTRALESIONAL; INTRAMUSCULAR; SOFT TISSUE AS NEEDED
Status: COMPLETED | OUTPATIENT
Start: 2025-03-07 | End: 2025-03-07

## 2025-03-07 RX ORDER — LIDOCAINE HYDROCHLORIDE 10 MG/ML
INJECTION, SOLUTION EPIDURAL; INFILTRATION; INTRACAUDAL; PERINEURAL AS NEEDED
Status: COMPLETED | OUTPATIENT
Start: 2025-03-07 | End: 2025-03-07

## 2025-03-07 RX ORDER — DEXAMETHASONE SODIUM PHOSPHATE 10 MG/ML
INJECTION INTRAMUSCULAR; INTRAVENOUS AS NEEDED
Status: COMPLETED | OUTPATIENT
Start: 2025-03-07 | End: 2025-03-07

## 2025-03-07 RX ADMIN — LIDOCAINE HYDROCHLORIDE 5 ML: 5 INJECTION, SOLUTION INFILTRATION at 12:24

## 2025-03-07 RX ADMIN — IOHEXOL 3 ML: 240 INJECTION, SOLUTION INTRATHECAL; INTRAVASCULAR; INTRAVENOUS; ORAL at 12:25

## 2025-03-07 RX ADMIN — LIDOCAINE HYDROCHLORIDE 5 ML: 20 INJECTION, SOLUTION EPIDURAL; INFILTRATION; INTRACAUDAL at 12:29

## 2025-03-07 RX ADMIN — METHYLPREDNISOLONE ACETATE 40 MG: 40 INJECTION, SUSPENSION INTRA-ARTICULAR; INTRALESIONAL; INTRAMUSCULAR; SOFT TISSUE at 12:29

## 2025-03-07 ASSESSMENT — COLUMBIA-SUICIDE SEVERITY RATING SCALE - C-SSRS
6. HAVE YOU EVER DONE ANYTHING, STARTED TO DO ANYTHING, OR PREPARED TO DO ANYTHING TO END YOUR LIFE?: NO
1. IN THE PAST MONTH, HAVE YOU WISHED YOU WERE DEAD OR WISHED YOU COULD GO TO SLEEP AND NOT WAKE UP?: NO
2. HAVE YOU ACTUALLY HAD ANY THOUGHTS OF KILLING YOURSELF?: NO

## 2025-03-07 ASSESSMENT — PAIN SCALES - GENERAL
PAINLEVEL_OUTOF10: 4
PAINLEVEL_OUTOF10: 0 - NO PAIN

## 2025-03-07 ASSESSMENT — PAIN - FUNCTIONAL ASSESSMENT
PAIN_FUNCTIONAL_ASSESSMENT: 0-10
PAIN_FUNCTIONAL_ASSESSMENT: 0-10

## 2025-03-07 NOTE — DISCHARGE INSTRUCTIONS
No heavy lifting or strenuous activity today.    Keep bandage on for 24 hours.  Keep injection site clean and dry, it may be sore for up to 48 hours.  For relief of pain and swelling, you may apply ice to the injection site area.  If pain persist you may apply moist heat.  Common side effects of steroids include insomnia, facial flushing, increased appetite, headaches, sweating, fluid retention. If you have diabetes your blood sugar may increase. Please monitor your diet and your blood sugar should return to normal in a few days. If your sugar becomes dangerously high, please contact your physician that manages your diabetes for further guidance.  Rare side effects include infection, bleeding, nerve damage. If you have fever, redness or swelling near site, severe pain, please go to the nearest emergency room. For other questions please call office at [phone]. Local anesthetics wear off in several hours and duration of relief vary from person to person.

## 2025-03-10 NOTE — ADDENDUM NOTE
Encounter addended by: Sujata Villafana RN on: 3/10/2025 11:44 AM   Actions taken: Contacts section saved, Flowsheet accepted

## 2025-03-12 NOTE — ADDENDUM NOTE
Encounter addended by: Sujata Villafana RN on: 3/12/2025 2:37 PM   Actions taken: Contacts section saved, Flowsheet accepted

## 2025-03-14 ENCOUNTER — APPOINTMENT (OUTPATIENT)
Dept: DERMATOLOGY | Facility: CLINIC | Age: 35
End: 2025-03-14
Payer: COMMERCIAL

## 2025-03-14 PROCEDURE — RXMED WILLOW AMBULATORY MEDICATION CHARGE

## 2025-03-20 ENCOUNTER — APPOINTMENT (OUTPATIENT)
Facility: CLINIC | Age: 35
End: 2025-03-20
Payer: COMMERCIAL

## 2025-03-26 DIAGNOSIS — B00.9 HSV (HERPES SIMPLEX VIRUS) INFECTION: ICD-10-CM

## 2025-03-26 PROCEDURE — RXMED WILLOW AMBULATORY MEDICATION CHARGE

## 2025-03-26 RX ORDER — VALACYCLOVIR HYDROCHLORIDE 500 MG/1
500 TABLET, FILM COATED ORAL DAILY
Qty: 90 TABLET | Refills: 3 | Status: SHIPPED | OUTPATIENT
Start: 2025-03-26 | End: 2026-03-26

## 2025-03-27 ENCOUNTER — PHARMACY VISIT (OUTPATIENT)
Dept: PHARMACY | Facility: CLINIC | Age: 35
End: 2025-03-27
Payer: MEDICAID

## 2025-03-27 ENCOUNTER — APPOINTMENT (OUTPATIENT)
Facility: CLINIC | Age: 35
End: 2025-03-27
Payer: COMMERCIAL

## 2025-03-27 VITALS
RESPIRATION RATE: 16 BRPM | WEIGHT: 147 LBS | HEART RATE: 68 BPM | BODY MASS INDEX: 26.05 KG/M2 | SYSTOLIC BLOOD PRESSURE: 115 MMHG | DIASTOLIC BLOOD PRESSURE: 73 MMHG | OXYGEN SATURATION: 100 % | HEIGHT: 63 IN

## 2025-03-27 DIAGNOSIS — G44.229 CHRONIC TENSION-TYPE HEADACHE, NOT INTRACTABLE: Primary | ICD-10-CM

## 2025-03-27 DIAGNOSIS — M47.812 FACET JOINT DISEASE OF CERVICAL REGION: ICD-10-CM

## 2025-03-27 DIAGNOSIS — M54.16 LUMBAR RADICULOPATHY, CHRONIC: ICD-10-CM

## 2025-03-27 PROBLEM — G43.009 MIGRAINE WITHOUT AURA AND WITHOUT STATUS MIGRAINOSUS, NOT INTRACTABLE: Status: ACTIVE | Noted: 2024-01-26

## 2025-03-27 PROBLEM — R51.9 CHRONIC HEADACHE DISORDER: Status: ACTIVE | Noted: 2025-03-27

## 2025-03-27 PROBLEM — G89.29 CHRONIC HEADACHE DISORDER: Status: ACTIVE | Noted: 2025-03-27

## 2025-03-27 PROCEDURE — 3008F BODY MASS INDEX DOCD: CPT | Performed by: STUDENT IN AN ORGANIZED HEALTH CARE EDUCATION/TRAINING PROGRAM

## 2025-03-27 PROCEDURE — 1036F TOBACCO NON-USER: CPT | Performed by: STUDENT IN AN ORGANIZED HEALTH CARE EDUCATION/TRAINING PROGRAM

## 2025-03-27 PROCEDURE — 99212 OFFICE O/P EST SF 10 MIN: CPT | Performed by: STUDENT IN AN ORGANIZED HEALTH CARE EDUCATION/TRAINING PROGRAM

## 2025-03-27 ASSESSMENT — PAIN SCALES - GENERAL
PAINLEVEL_OUTOF10: 2

## 2025-03-27 ASSESSMENT — ENCOUNTER SYMPTOMS
BACK PAIN: 1
NECK PAIN: 1
NECK STIFFNESS: 1

## 2025-03-27 ASSESSMENT — PAIN - FUNCTIONAL ASSESSMENT: PAIN_FUNCTIONAL_ASSESSMENT: 0-10

## 2025-03-27 NOTE — PROGRESS NOTES
Subjective   Patient ID: Jese Roman is a 34 y.o. female who presents for Follow-up (Patient is here for PP follow up visit Transforaminal L4-L5 on 3/7/25. She states she has had 80-90% improvement in her lower back pain. She complains of neck pain, with numbness and headaches. ).  HPI    Ms Jese Roman is a pleasant 34-year-old female who presents as follow up visit after recent epidural steroid injection. She origianlly had presented with complaints of lower back pain of approximately 2 and half weeks duration.  She has a past medical history significant for Chiari malformation that she follows with neurology for.  She is here as a follow up visit following a L4 transforaminal dural steroid injection with Depo-Medrol.  Overall the patient is very healthy and lives a healthy lifestyle, participating in yoga and frequent physical activity, as well as a very healthy diet.  The pain that she is experiencing significantly affects her life and her ability to live a healthy lifestyle      The patient states that she received almost 100% pain relief following the injection.  She is extremely happy with results.    OARRS:  No data recorded  I have personally reviewed the OARRS report for Jese Roman. I have considered the risks of abuse, dependence, addiction and diversion          Review of Systems   Musculoskeletal:  Positive for back pain, neck pain and neck stiffness.        Current Outpatient Medications   Medication Instructions    benzoyl peroxide 5 % external wash Daily in shower to affected areas    doxycycline (MONODOX) 50 mg, oral, 2 times daily, Take with at least 8 ounces (large glass) of water, do not lie down for 30 minutes after. Take with food    tretinoin (Retin-A) 0.025 % cream Apply a pea-size amount to entire face at bedtime. Decrease use if too drying.    valACYclovir (VALTREX) 500 mg, oral, Daily        Past Medical History:   Diagnosis Date    Acne     Anxiety      Arnold-Chiari malformation, type I (Multi) 09/05/2023    Arthritis     Cervical disc disorder     Chiari malformation type I (Multi)     Chronic pain disorder     Depression     Dry eyes     Dyspareunia, female     Extremity pain     Food intolerance     Headache, unspecified     Chronic headaches    Irritable bowel syndrome     Joint pain     Low back pain     Lumbosacral disc disease     Migraine     Neck pain     PTSD (post-traumatic stress disorder)     Toe laceration 11/28/2023    Vaginal polyp         Past Surgical History:   Procedure Laterality Date    OTHER SURGICAL HISTORY  09/19/2022    Throat surgery    WISDOM TOOTH EXTRACTION          Family History   Problem Relation Name Age of Onset    Arthritis Mother Dolores     Other (head aches) Mother Dolores     Alcohol abuse Mother Dolores     Depression Mother Dolores     Hypertension Mother Dolores     Cancer Mother Dolores     Mental illness Mother Dolores     Hyperthyroidism Father Prince     Skin cancer Father Prince     Melanoma Father Prince     Alcohol abuse Father Prince     Cancer Father Prince     Depression Father Prince     Mental illness Father Prince     Acne Father Prince     Chiari malformation Sister      Breast cancer Paternal Grandmother Aubrie         Allergies   Allergen Reactions    Penicillins Anaphylaxis    Gabapentin Dizziness    Flu Vaccine [id number](6mos Up) Headache        MR lumbar spine wo IV contrast 02/28/2025    Narrative  Interpreted By:  Alison Chairez,  STUDY:  MRI of the lumbar spine without IV contrast;  2/28/2025 9:18 am    INDICATION:  Signs/Symptoms:lumbar radiculopathy.    ,M54.16 Radiculopathy, lumbar region    COMPARISON:  None.    ACCESSION NUMBER(S):  [id number]    ORDERING CLINICIAN:  BRAYDEN CARDOZA    TECHNIQUE:  Sagittal and axial STIR and T1-weighted MRI images of the lumbar  spine were acquired using a spondylolysis protocol.  No contrast was  administered.    FINDINGS:  For counting purposes the last lumbarized vertebral body is  labeled  L5.    Alignment, vertebral body heights and marrow signal pattern are  within normal limits.    There is desiccated disc signal at L4-L5 with mild degenerative  endplate changes and loss of disc height.    The conus terminates at L1 and is unremarkable. Prevertebral soft  tissues are not thickened.    Evaluation by level:    T12-L1: No spinal canal or neural foraminal stenosis    L1-L2: No spinal canal or neural foraminal stenosis    L2-L3: No spinal canal or neural foraminal stenosis.    L3-L4: Minimal disc bulge and mild facet arthrosis. No spinal canal  or neural foraminal stenosis.    L4-L5: Disc bulge with a central disc extrusion measuring 1.5 cm in  craniocaudal dimension and 1.1 cm in AP dimension. This results in  moderate spinal canal stenosis, marked narrowing of the bilateral  subarticular recess and mild bilateral neural foraminal stenosis.    L5-S1: No spinal canal or neural foraminal stenosis.    Impression  At L4-L5 there is a central disc extrusion measuring 1.5 x 1.1 cm  resulting in moderate spinal canal stenosis, marked narrowing of the  bilateral subarticular recess and mild bilateral neural foraminal  stenosis.    I personally reviewed the images/study and I agree with the findings  as stated. This study was interpreted at [place], [place].    MACRO:  None    Signed by: Alison Chairez 2/28/2025 10:07 AM  Dictation workstation:   [id number]      Objective     Vitals:    03/27/25 1005   BP: 115/73   Pulse: 68   Resp: 16   SpO2: 100%      Pain Score:   2        Physical Exam    GENERAL EXAM  Vital Signs: Vital signs to include heart rate, respiration rate, blood pressure, and temperature were reviewed.  General Appearance:  Awake, alert, healthy appearing, well developed, No acute distress.  Head: Normocephalic without evidence of head injury.  Neck: The appearance of the neck was normal without swelling with a midline trachea.  Eyes: The  eyelids and eyebrows exhibited no abnormalities.  Pupils were not pin-point.  Sclera was without icterus.  Lungs: Respiration rhythm and depth was normal.  Respiratory movements were normal without labored breathing.  Cardiovascular: No peripheral edema was present.    Neurological: Patient was oriented to time, place, and person.  Speech was normal.  Balance, gait, and stance were unremarkable.    Psychiatric: Appearance was normal with appropriate dress.  Mood was euthymic and affect was normal.  Skin: Affected regions were without ecchymosis or skin lesions.        Physical exam as above except:            Assessment/Plan   Problem List Items Addressed This Visit             ICD-10-CM    Lumbar radiculopathy, chronic M54.16    Chronic headache disorder - Primary R51.9, G89.29    Facet joint disease of cervical region M47.812   Ms Jese Roman is a pleasant 34-year-old female who presents as follow up visit after recent epidural steroid injection. She origianlly had presented with complaints of lower back pain of approximately 2 and half weeks duration.  She has a past medical history significant for Chiari malformation that she follows with neurology for.  She is here as a follow up visit following a L4 transforaminal dural steroid injection with Depo-Medrol.  Overall the patient is very healthy and lives a healthy lifestyle, participating in yoga and frequent physical activity, as well as a very healthy diet.  The pain that she is experiencing significantly affects her life and her ability to live a healthy lifestyle      The patient states that she received almost 100% pain relief following the injection.  She is extremely happy with results.    However she also describes pain in her neck the paraspinal musculature with intermittent radicular symptoms to her upper extremities.  However most of the pain is in her neck and this is present when she is rotating her neck.     Imaging:  L3-4- minimal disc  bulge  L4-5-disc bulge with central disc extrusion 1.5 cm x 1.1. moderate spinal canal stenosis and marked narrowing of bilateral subarticular recess  L5-S1 - no stenosis    Physical exam:  Pain palpation of paraspinal musculature in C spine and L spine  decreased pinprick sensation along the L4 and L5 nerve dermatomes.      Previous interventions  1.- [place] CLAUDINE L4-5 60-80% relief  3.07.2025- TFESI L4 bilateral with depomedrol 100% relief    Diagnosis  Lumbar disc displacement with radicular symptoms  Cervical facet joint arthropathy     PLAN  -The patient will follow-up as necessary for her lower back pain.  - The patient also has symptoms in line with cervical facet joint arthropathy.  If this worsens in the future she will contact us and let us know if she would like to proceed with diagnostic injections and potential radiofrequency ablation.         This note was generated with the aid of dictation software, there may be typos despite my attempts at proofreading.

## 2025-04-09 PROCEDURE — RXMED WILLOW AMBULATORY MEDICATION CHARGE

## 2025-04-14 ENCOUNTER — PHARMACY VISIT (OUTPATIENT)
Dept: PHARMACY | Facility: CLINIC | Age: 35
End: 2025-04-14
Payer: MEDICAID

## 2025-04-16 ENCOUNTER — APPOINTMENT (OUTPATIENT)
Dept: OBSTETRICS AND GYNECOLOGY | Facility: CLINIC | Age: 35
End: 2025-04-16
Payer: COMMERCIAL

## 2025-04-16 VITALS
BODY MASS INDEX: 25.34 KG/M2 | WEIGHT: 143 LBS | SYSTOLIC BLOOD PRESSURE: 132 MMHG | HEIGHT: 63 IN | DIASTOLIC BLOOD PRESSURE: 84 MMHG

## 2025-04-16 DIAGNOSIS — Z30.431 SURVEILLANCE OF PREVIOUSLY PRESCRIBED INTRAUTERINE CONTRACEPTIVE DEVICE: ICD-10-CM

## 2025-04-16 DIAGNOSIS — Z01.419 WELL WOMAN EXAM: Primary | ICD-10-CM

## 2025-04-16 DIAGNOSIS — Z11.3 SCREENING EXAMINATION FOR STI: ICD-10-CM

## 2025-04-16 PROCEDURE — 1036F TOBACCO NON-USER: CPT | Performed by: MIDWIFE

## 2025-04-16 PROCEDURE — 3008F BODY MASS INDEX DOCD: CPT | Performed by: MIDWIFE

## 2025-04-16 PROCEDURE — 99395 PREV VISIT EST AGE 18-39: CPT | Performed by: MIDWIFE

## 2025-04-16 NOTE — PROGRESS NOTES
"Subjective   Patient ID: Jese Roman is a 34 y.o. female who presents for Annual Exam.  HPI  PMHx: ; Pap  NIL Neg; Liletta placed 2023  SocHx:  last IC \"months ago\"  ROS: no pelvic pain, no urinary c/o, NAD  Review of Systems    Objective   Physical Exam  Vitals and nursing note reviewed.   Constitutional:       Appearance: Normal appearance.   HENT:      Nose: Nose normal.   Eyes:      Pupils: Pupils are equal, round, and reactive to light.   Neck:      Thyroid: No thyroid mass.   Cardiovascular:      Rate and Rhythm: Normal rate and regular rhythm.   Pulmonary:      Effort: Pulmonary effort is normal.      Breath sounds: Normal breath sounds.   Chest:      Chest wall: No mass.   Breasts:     Right: Normal.      Left: Normal.   Abdominal:      Palpations: Abdomen is soft. There is no mass.      Tenderness: There is no abdominal tenderness.   Genitourinary:     General: Normal vulva.      Labia:         Right: No rash, tenderness or lesion.         Left: No rash, tenderness or lesion.       Vagina: Normal.      Cervix: Normal.      Uterus: Normal.       Adnexa: Right adnexa normal and left adnexa normal.      Comments: IUD strings present  Musculoskeletal:         General: Normal range of motion.   Lymphadenopathy:      Cervical: No cervical adenopathy.      Lower Body: No right inguinal adenopathy. No left inguinal adenopathy.   Skin:     General: Skin is warm and dry.   Neurological:      Mental Status: She is alert and oriented to person, place, and time.      Motor: Motor function is intact.      Gait: Gait is intact.   Psychiatric:         Mood and Affect: Mood normal.         Assessment/Plan   Diagnoses and all orders for this visit:  Well woman exam  -     C. trachomatis / N. gonorrhoeae, Amplified, Urogenital  Screening examination for STI  -     C. trachomatis / N. gonorrhoeae, Amplified, Urogenital  Surveillance of previously prescribed intrauterine contraceptive device    Reassurance " given re exam  Pap next due 2027  Liletta due for removal 1/2031  RTO AE/PRN       Samantha Hay, HELEN-CINDY, ND 04/16/25 11:36 AM

## 2025-04-17 LAB
C TRACH RRNA SPEC QL NAA+PROBE: NOT DETECTED
N GONORRHOEA RRNA SPEC QL NAA+PROBE: NOT DETECTED
QUEST GC CT AMPLIFIED (ALWAYS MESSAGE): NORMAL

## 2025-04-18 ENCOUNTER — APPOINTMENT (OUTPATIENT)
Dept: PRIMARY CARE | Facility: CLINIC | Age: 35
End: 2025-04-18
Payer: COMMERCIAL

## 2025-05-07 PROCEDURE — RXMED WILLOW AMBULATORY MEDICATION CHARGE

## 2025-05-09 ENCOUNTER — PHARMACY VISIT (OUTPATIENT)
Dept: PHARMACY | Facility: CLINIC | Age: 35
End: 2025-05-09
Payer: MEDICAID

## 2025-05-15 ENCOUNTER — OFFICE VISIT (OUTPATIENT)
Dept: OBSTETRICS AND GYNECOLOGY | Facility: CLINIC | Age: 35
End: 2025-05-15
Payer: COMMERCIAL

## 2025-05-29 ENCOUNTER — DOCUMENTATION (OUTPATIENT)
Dept: PHYSICAL THERAPY | Facility: CLINIC | Age: 35
End: 2025-05-29
Payer: COMMERCIAL

## 2025-05-29 DIAGNOSIS — M54.16 LUMBAR RADICULOPATHY, CHRONIC: ICD-10-CM

## 2025-05-29 NOTE — PROGRESS NOTES
Discharge Summary    Name: Jese Roman  MRN: [id number]  : 1990  Date: 25    Discharge Summary: PT    Discharge Information: Date of last visit 24    Therapy Summary: Patient is 33 year old evaluated and treated x 5 visits to physical therapy with signs and symptoms consistent with L lumbar radiculopathy to L glute and posterolateral thigh/lower leg to toes. Patient had higher pain levels, decreased lumbar ROM with pain and strength limiting functional mobility and ADLs.     Discharge Status: Good progress toward goals     Rehab Discharge Reason: Achieved all and/or the most significant goals(s) and Failed to schedule and/or keep follow-up appointment(s)

## 2025-05-30 ENCOUNTER — APPOINTMENT (OUTPATIENT)
Dept: GASTROENTEROLOGY | Facility: CLINIC | Age: 35
End: 2025-05-30
Payer: COMMERCIAL

## 2025-06-05 PROCEDURE — RXMED WILLOW AMBULATORY MEDICATION CHARGE

## 2025-06-09 ENCOUNTER — APPOINTMENT (OUTPATIENT)
Dept: DERMATOLOGY | Facility: CLINIC | Age: 35
End: 2025-06-09
Payer: COMMERCIAL

## 2025-06-09 DIAGNOSIS — L70.0 ACNE VULGARIS: Primary | ICD-10-CM

## 2025-06-09 PROCEDURE — 99213 OFFICE O/P EST LOW 20 MIN: CPT | Performed by: NURSE PRACTITIONER

## 2025-06-09 PROCEDURE — 1036F TOBACCO NON-USER: CPT | Performed by: NURSE PRACTITIONER

## 2025-06-09 PROCEDURE — RXMED WILLOW AMBULATORY MEDICATION CHARGE

## 2025-06-09 RX ORDER — SPIRONOLACTONE 50 MG/1
TABLET, FILM COATED ORAL
Qty: 180 TABLET | Refills: 1 | Status: SHIPPED | OUTPATIENT
Start: 2025-06-09

## 2025-06-09 NOTE — PROGRESS NOTES
Subjective     Jese Roman is a 34 y.o. female who presents for the following: Acne (Current therapies: tretinoin 0.025% and BP wash. Patient was taking Doxycycline 50 mg, but states only once daily. Notes worsening break outs when eating certain foods, such as dairy. Overall states she notes improvement and would like to continue current regimen. ).          Review of Systems:  No other skin or systemic complaints other than what is documented elsewhere in the note.    The following portions of the chart were reviewed this encounter and updated as appropriate:  Tobacco  Allergies  Meds  Problems  Med Hx  Surg Hx  Fam Hx         Skin Cancer History  Biopsy Log Book  No skin cancers from Specimen Tracking.    Additional History      Specialty Problems    None    Past Medical History:  Jese Roman  has a past medical history of Acne, Anxiety, Arnold-Chiari malformation, type I (Multi) (09/05/2023), Arthritis, Cervical disc disorder, Chiari malformation type I (Multi), Chronic pain disorder, Depression, Dry eyes, Dyspareunia, female, Extremity pain, Food intolerance, Headache, unspecified, Irritable bowel syndrome, Joint pain, Low back pain, Lumbosacral disc disease, Migraine, Neck pain, PTSD (post-traumatic stress disorder), Toe laceration (11/28/2023), and Vaginal polyp.    Past Surgical History:  Jese Roman  has a past surgical history that includes Other surgical history (09/19/2022) and [place] tooth extraction.    Family History:  Patient family history includes Acne in her father; Alcohol abuse in her father and mother; Arthritis in her mother; Breast cancer in her paternal grandmother; Cancer in her father and mother; Chiari malformation in her sister; Depression in her father and mother; Hypertension in her mother; Hyperthyroidism in her father; Melanoma in her father; Mental illness in her father and mother; Skin cancer in her father; head aches in her mother.    Social  History:  Jese Roman  reports that she quit smoking about 10 years ago. Her smoking use included cigarettes. She started smoking about 19 years ago. She has a 4.6 pack-year smoking history. She has been exposed to tobacco smoke. She has never used smokeless tobacco. She reports current alcohol use. She reports current drug use. Drug: Marijuana.    Allergies:  Penicillins, Gabapentin, and Flu vaccine [id number](6mos up)    Current Medications / CAM's:  Current Medications[1]     Objective   Well appearing patient in no apparent distress; mood and affect are within normal limits.    A focused skin examination was performed. All findings within normal limits unless otherwise noted below.    Assessment/Plan   Skin Exam  1. ACNE VULGARIS  Head - Anterior (Face)  Scattered open and closed comedones and erythematous inflammatory papules, and pustules.   -Discussed the nature of the condition  -Discussed treatment options  -Recommend to begin topical retinoids; if just starting therapy with retinoid, start application of a very thin layer three nights per week as tolerated. If extreme redness or dryness occurs, hold medication until resolved and then restart at a greater interval. May apply moisturizer after application of retinoid. Advance toward nightly use as tolerated  -Recommend: Start spironolactone 50-100mg, take as directed. The following information regarding the use of Spironolactone was discussed:  Potential side effects including headache, dizziness, fatigue, breast tenderness, irregular menses, and hyperkalemia.  Women who are pregnant or breast feeding should not take Spironolactone.   - Continue tretinoin and BP wash, use both as directed.   - Risks, benefits, and side effects discussed. Patient understood and agrees with the plan.     Follow up in 6 months. Please call me if there are any changes or development of concerning symptoms (lesion/skin condition is changing, bleeding, enlarging, or  worsening).    Related Medications  spironolactone (Aldactone) 50 mg tablet  Take 1 tablet in the AM for 2 weeks, then increase to 2 tablets in the AM daily.         [1]   Current Outpatient Medications:     benzoyl peroxide 5 % external wash, Daily in shower to affected areas, Disp: 227 g, Rfl: 11    spironolactone (Aldactone) 50 mg tablet, Take 1 tablet in the AM for 2 weeks, then increase to 2 tablets in the AM daily., Disp: 180 tablet, Rfl: 1    tretinoin (Retin-A) 0.025 % cream, Apply a pea-size amount to entire face at bedtime. Decrease use if too drying., Disp: 45 g, Rfl: 5    valACYclovir (Valtrex) 500 mg tablet, Take 1 tablet (500 mg) by mouth once daily., Disp: 90 tablet, Rfl: 3

## 2025-06-09 NOTE — Clinical Note
-Discussed the nature of the condition  -Discussed treatment options  -Recommend to begin topical retinoids; if just starting therapy with retinoid, start application of a very thin layer three nights per week as tolerated. If extreme redness or dryness occurs, hold medication until resolved and then restart at a greater interval. May apply moisturizer after application of retinoid. Advance toward nightly use as tolerated  -Recommend: Start spironolactone 50-100mg, take as directed. The following information regarding the use of Spironolactone was discussed:  Potential side effects including headache, dizziness, fatigue, breast tenderness, irregular menses, and hyperkalemia.  Women who are pregnant or breast feeding should not take Spironolactone.   - Continue tretinoin and BP wash, use both as directed.   - Risks, benefits, and side effects discussed. Patient understood and agrees with the plan.     Follow up in 6 months. Please call me if there are any changes or development of concerning symptoms (lesion/skin condition is changing, bleeding, enlarging, or worsening).

## 2025-06-11 ENCOUNTER — APPOINTMENT (OUTPATIENT)
Dept: OBSTETRICS AND GYNECOLOGY | Facility: CLINIC | Age: 35
End: 2025-06-11
Payer: COMMERCIAL

## 2025-06-11 ENCOUNTER — PHARMACY VISIT (OUTPATIENT)
Dept: PHARMACY | Facility: CLINIC | Age: 35
End: 2025-06-11
Payer: MEDICAID

## 2025-06-26 PROCEDURE — RXMED WILLOW AMBULATORY MEDICATION CHARGE

## 2025-06-29 NOTE — PROGRESS NOTES
Subjective   Patient ID: Jese Roman is a 33 y.o. female who presents for Post-op.  No postop issues.  Had sex last night.  No issues.  Can tell the difference.  Feeling much better.  Pathology benign.  Resume all normal activities     POCT pregnancy, urine manually resulted Final result 5/23/2024    Case Report                   Case Report  Surgical Pathology                                Case: [id number]                                  Authorizing Provider:  Saul Hanson MD            Collected:           05/23/2024 1059              Ordering Location:     [facility]   Received:            05/23/2024 1248                                     OR                                                                          Pathologist:           Clara Santos MD, MS                                                            Specimen:    VAGINA BIOPSY, VAGINAL POLYPS                                                                 FINAL DIAGNOSIS  A. Vagina polyp, biopsy:  - Consistent with fibroepithelial polyp. See comment.   Electronically signed by Clara Santos MD, MS on 5/28/2024 at 1413    By the signature on this report, the individual or group listed as making the Final Interpretation/Diagnosis certifies that they have reviewed this case.     Comment  This case was shown at the [facility] GYN consensus conference via Zoom on 5/28/2024  Attending: Mookie Santos, NISREEN Jackson.    Gross Description  Received in formalin, labeled with the patient´s name and hospital number, are 2 polypoid fragments of soft tissue measuring 1.0 x 0.3 x 0.3 cm and 1.0 x 0.5 x 0.4 cm.The deep surfaces are inked blue.  The specimen is serially sectioned and entirely submitted in 3 cassettes.  AE  Summary of Cassettes:  Label     Site  1-2        #1 polyp  3           #2 polyp             Review of Systems   All other systems reviewed and are negative.      Objective   Physical Exam    Assessment/Plan   Postop  visit         Saul Hanson MD 07/02/24 10:14 AM    Normal for race

## 2025-07-02 ENCOUNTER — PHARMACY VISIT (OUTPATIENT)
Dept: PHARMACY | Facility: CLINIC | Age: 35
End: 2025-07-02
Payer: MEDICAID

## 2025-07-18 ENCOUNTER — APPOINTMENT (OUTPATIENT)
Dept: PRIMARY CARE | Facility: CLINIC | Age: 35
End: 2025-07-18
Payer: COMMERCIAL

## 2025-09-08 ENCOUNTER — APPOINTMENT (OUTPATIENT)
Dept: NEUROLOGY | Facility: CLINIC | Age: 35
End: 2025-09-08
Payer: COMMERCIAL

## 2025-09-19 ENCOUNTER — APPOINTMENT (OUTPATIENT)
Dept: GASTROENTEROLOGY | Facility: CLINIC | Age: 35
End: 2025-09-19
Payer: COMMERCIAL

## 2025-10-17 ENCOUNTER — APPOINTMENT (OUTPATIENT)
Dept: PRIMARY CARE | Facility: CLINIC | Age: 35
End: 2025-10-17
Payer: COMMERCIAL

## 2025-12-08 ENCOUNTER — APPOINTMENT (OUTPATIENT)
Dept: DERMATOLOGY | Facility: CLINIC | Age: 35
End: 2025-12-08
Payer: COMMERCIAL

## (undated) DEVICE — Device

## (undated) DEVICE — ELECTRODE BALL, 5MM

## (undated) DEVICE — TOWEL PACK, STERILE, 4/PACK, BLUE

## (undated) DEVICE — CAUTERY, PENCIL, PUSH BUTTON, SMOKE EVAC, 70MM

## (undated) DEVICE — COVER HANDLE LIGHT, STERIS, BLUE, STERILE

## (undated) DEVICE — COUNTER, NEEDLE, FOAM STRIP, DOUBLE, W/BLADEGUARD, 30 COUNT